# Patient Record
Sex: MALE | Race: WHITE | NOT HISPANIC OR LATINO | Employment: OTHER | URBAN - METROPOLITAN AREA
[De-identification: names, ages, dates, MRNs, and addresses within clinical notes are randomized per-mention and may not be internally consistent; named-entity substitution may affect disease eponyms.]

---

## 2019-04-15 ENCOUNTER — OFFICE VISIT - RIVER FALLS (OUTPATIENT)
Dept: FAMILY MEDICINE | Facility: CLINIC | Age: 69
End: 2019-04-15

## 2019-04-15 ASSESSMENT — MIFFLIN-ST. JEOR: SCORE: 1618.11

## 2019-04-18 LAB
1,25(OH)2D SERPL-MCNC: 45 PG/ML (ref 18–72)
BUN SERPL-MCNC: 11 MG/DL (ref 7–25)
BUN/CREAT RATIO - HISTORICAL: NORMAL (ref 6–22)
CALCIUM SERPL-MCNC: 9.5 MG/DL (ref 8.6–10.3)
CHLORIDE BLD-SCNC: 107 MMOL/L (ref 98–110)
CHOLEST SERPL-MCNC: 210 MG/DL
CHOLEST/HDLC SERPL: 4.3 {RATIO}
CO2 SERPL-SCNC: 27 MMOL/L (ref 20–32)
CREAT SERPL-MCNC: 1.05 MG/DL (ref 0.7–1.25)
EGFRCR SERPLBLD CKD-EPI 2021: 73 ML/MIN/1.73M2
ERYTHROCYTE [DISTWIDTH] IN BLOOD BY AUTOMATED COUNT: 12.3 % (ref 11–15)
GLUCOSE BLD-MCNC: 93 MG/DL (ref 65–99)
HCT VFR BLD AUTO: 45.7 % (ref 38.5–50)
HDLC SERPL-MCNC: 49 MG/DL
HGB BLD-MCNC: 15.6 GM/DL (ref 13.2–17.1)
LDLC SERPL CALC-MCNC: 142 MG/DL
MCH RBC QN AUTO: 32.2 PG (ref 27–33)
MCHC RBC AUTO-ENTMCNC: 34.1 GM/DL (ref 32–36)
MCV RBC AUTO: 94.4 FL (ref 80–100)
NONHDLC SERPL-MCNC: 161 MG/DL
PLATELET # BLD AUTO: 242 10*3/UL (ref 140–400)
PMV BLD: 11.5 FL (ref 7.5–12.5)
POTASSIUM BLD-SCNC: 5.1 MMOL/L (ref 3.5–5.3)
PSA SERPL-MCNC: 1.3 NG/ML
RBC # BLD AUTO: 4.84 10*6/UL (ref 4.2–5.8)
SODIUM SERPL-SCNC: 141 MMOL/L (ref 135–146)
TRIGL SERPL-MCNC: 85 MG/DL
VITAMIN D2, 1,25 (OH)2 - QUEST: <8 PG/ML
VITAMIN D3, 1,25 (OH)2 - QUEST: 45 PG/ML
WBC # BLD AUTO: 5.4 10*3/UL (ref 3.8–10.8)

## 2019-04-24 ENCOUNTER — OFFICE VISIT - RIVER FALLS (OUTPATIENT)
Dept: FAMILY MEDICINE | Facility: CLINIC | Age: 69
End: 2019-04-24

## 2019-04-24 ASSESSMENT — MIFFLIN-ST. JEOR: SCORE: 1602.68

## 2019-04-25 LAB — HOMOCYSTINE: 9.8 MCMOL/L

## 2019-04-26 ENCOUNTER — COMMUNICATION - RIVER FALLS (OUTPATIENT)
Dept: FAMILY MEDICINE | Facility: CLINIC | Age: 69
End: 2019-04-26

## 2020-12-08 ENCOUNTER — OFFICE VISIT - RIVER FALLS (OUTPATIENT)
Dept: FAMILY MEDICINE | Facility: CLINIC | Age: 70
End: 2020-12-08

## 2020-12-08 ENCOUNTER — TRANSFERRED RECORDS (OUTPATIENT)
Dept: MULTI SPECIALTY CLINIC | Facility: CLINIC | Age: 70
End: 2020-12-08

## 2020-12-08 ENCOUNTER — AMBULATORY - HEALTHEAST (OUTPATIENT)
Dept: SURGERY | Facility: TELEHEALTH | Age: 70
End: 2020-12-08

## 2020-12-08 DIAGNOSIS — K40.90 LEFT INGUINAL HERNIA: ICD-10-CM

## 2020-12-09 ENCOUNTER — COMMUNICATION - RIVER FALLS (OUTPATIENT)
Dept: FAMILY MEDICINE | Facility: CLINIC | Age: 70
End: 2020-12-09

## 2020-12-09 LAB
BUN SERPL-MCNC: 13 MG/DL (ref 7–25)
BUN/CREAT RATIO - HISTORICAL: NORMAL (ref 6–22)
CALCIUM SERPL-MCNC: 9.5 MG/DL (ref 8.6–10.3)
CHLORIDE BLD-SCNC: 105 MMOL/L (ref 98–110)
CHOLEST SERPL-MCNC: 183 MG/DL
CHOLEST/HDLC SERPL: 3.8 {RATIO}
CO2 SERPL-SCNC: 27 MMOL/L (ref 20–32)
CREAT SERPL-MCNC: 1.12 MG/DL (ref 0.7–1.25)
EGFRCR SERPLBLD CKD-EPI 2021: 67 ML/MIN/1.73M2
ERYTHROCYTE [DISTWIDTH] IN BLOOD BY AUTOMATED COUNT: 12.1 % (ref 11–15)
GLUCOSE BLD-MCNC: 86 MG/DL (ref 65–99)
HCT VFR BLD AUTO: 43.9 % (ref 38.5–50)
HDLC SERPL-MCNC: 48 MG/DL
HGB BLD-MCNC: 15.4 GM/DL (ref 13.2–17.1)
LDLC SERPL CALC-MCNC: 120 MG/DL
MCH RBC QN AUTO: 32.6 PG (ref 27–33)
MCHC RBC AUTO-ENTMCNC: 35.1 GM/DL (ref 32–36)
MCV RBC AUTO: 92.8 FL (ref 80–100)
NONHDLC SERPL-MCNC: 135 MG/DL
PLATELET # BLD AUTO: 214 10*3/UL (ref 140–400)
PMV BLD: 11.7 FL (ref 7.5–12.5)
POTASSIUM BLD-SCNC: 4.3 MMOL/L (ref 3.5–5.3)
PSA SERPL-MCNC: 1.4 NG/ML
RBC # BLD AUTO: 4.73 10*6/UL (ref 4.2–5.8)
SODIUM SERPL-SCNC: 139 MMOL/L (ref 135–146)
TRIGL SERPL-MCNC: 55 MG/DL
WBC # BLD AUTO: 4.3 10*3/UL (ref 3.8–10.8)

## 2020-12-15 ENCOUNTER — COMMUNICATION - RIVER FALLS (OUTPATIENT)
Dept: FAMILY MEDICINE | Facility: CLINIC | Age: 70
End: 2020-12-15

## 2020-12-31 ENCOUNTER — COMMUNICATION - RIVER FALLS (OUTPATIENT)
Dept: FAMILY MEDICINE | Facility: CLINIC | Age: 70
End: 2020-12-31

## 2021-01-05 ENCOUNTER — OFFICE VISIT - RIVER FALLS (OUTPATIENT)
Dept: FAMILY MEDICINE | Facility: CLINIC | Age: 71
End: 2021-01-05

## 2021-01-06 ENCOUNTER — COMMUNICATION - RIVER FALLS (OUTPATIENT)
Dept: FAMILY MEDICINE | Facility: CLINIC | Age: 71
End: 2021-01-06

## 2021-01-06 ENCOUNTER — COMMUNICATION - HEALTHEAST (OUTPATIENT)
Dept: SURGERY | Facility: CLINIC | Age: 71
End: 2021-01-06

## 2021-01-06 ENCOUNTER — SURGERY - HEALTHEAST (OUTPATIENT)
Dept: SURGERY | Facility: CLINIC | Age: 71
End: 2021-01-06

## 2021-01-06 ENCOUNTER — OFFICE VISIT - HEALTHEAST (OUTPATIENT)
Dept: SURGERY | Facility: TELEHEALTH | Age: 71
End: 2021-01-06

## 2021-01-06 DIAGNOSIS — K40.91 UNILATERAL RECURRENT INGUINAL HERNIA WITHOUT OBSTRUCTION OR GANGRENE: ICD-10-CM

## 2021-01-06 DIAGNOSIS — K40.90 UNILATERAL INGUINAL HERNIA WITHOUT OBSTRUCTION OR GANGRENE, RECURRENCE NOT SPECIFIED: ICD-10-CM

## 2021-01-07 ENCOUNTER — AMBULATORY - HEALTHEAST (OUTPATIENT)
Dept: SURGERY | Facility: AMBULATORY SURGERY CENTER | Age: 71
End: 2021-01-07

## 2021-01-07 DIAGNOSIS — Z11.59 ENCOUNTER FOR SCREENING FOR OTHER VIRAL DISEASES: ICD-10-CM

## 2021-01-25 ENCOUNTER — AMBULATORY - RIVER FALLS (OUTPATIENT)
Dept: FAMILY MEDICINE | Facility: CLINIC | Age: 71
End: 2021-01-25

## 2021-01-27 LAB — SARS-COV-2 RNA RESP QL NAA+PROBE: NEGATIVE

## 2021-01-27 ASSESSMENT — MIFFLIN-ST. JEOR: SCORE: 1562.78

## 2021-01-28 ENCOUNTER — ANESTHESIA - HEALTHEAST (OUTPATIENT)
Dept: SURGERY | Facility: AMBULATORY SURGERY CENTER | Age: 71
End: 2021-01-28

## 2021-01-29 ENCOUNTER — AMBULATORY - HEALTHEAST (OUTPATIENT)
Dept: SURGERY | Facility: CLINIC | Age: 71
End: 2021-01-29

## 2021-01-29 ENCOUNTER — SURGERY - HEALTHEAST (OUTPATIENT)
Dept: SURGERY | Facility: AMBULATORY SURGERY CENTER | Age: 71
End: 2021-01-29

## 2021-01-29 DIAGNOSIS — Z98.890 POSTOPERATIVE STATE: ICD-10-CM

## 2021-01-29 ASSESSMENT — MIFFLIN-ST. JEOR: SCORE: 1562.78

## 2021-02-12 ENCOUNTER — OFFICE VISIT - HEALTHEAST (OUTPATIENT)
Dept: SURGERY | Facility: CLINIC | Age: 71
End: 2021-02-12

## 2021-02-12 DIAGNOSIS — Z98.890 POSTOPERATIVE STATE: ICD-10-CM

## 2021-06-05 VITALS — HEIGHT: 69 IN | WEIGHT: 178 LBS | BODY MASS INDEX: 26.36 KG/M2

## 2021-06-05 VITALS — WEIGHT: 189.8 LBS | SYSTOLIC BLOOD PRESSURE: 124 MMHG | DIASTOLIC BLOOD PRESSURE: 64 MMHG | HEART RATE: 64 BPM

## 2021-06-14 NOTE — ANESTHESIA POSTPROCEDURE EVALUATION
Patient: Sarbjit Butcher  Procedure(s):  HERNIORRHAPHY, INGUINAL, LAPAROSCOPIC (Left)  Anesthesia type: general    Patient location: Phase II Recovery  Last vitals:   Vitals Value Taken Time   /71 01/29/21 1230   Temp 36.4  C (97.6  F) 01/29/21 1215   Pulse 58 01/29/21 1238   Resp 16 01/29/21 1215   SpO2 94 % 01/29/21 1238   Vitals shown include unvalidated device data.  Post vital signs: stable  Level of consciousness: awake and responds to simple questions  Post-anesthesia pain: pain controlled  Post-anesthesia nausea and vomiting: no  Pulmonary: unassisted, return to baseline  Cardiovascular: stable and blood pressure at baseline  Hydration: adequate  Anesthetic events: no    QCDR Measures:  ASA# 11 - Wendy-op Cardiac Arrest: ASA11B - Patient did NOT experience unanticipated cardiac arrest  ASA# 12 - Wendy-op Mortality Rate: ASA12B - Patient did NOT die  ASA# 13 - PACU Re-Intubation Rate: ASA13B - Patient did NOT require a new airway mgmt  ASA# 10 - Composite Anes Safety: ASA10A - No serious adverse event    Additional Notes:   18-Mar-2021 16:10

## 2021-06-14 NOTE — ANESTHESIA PREPROCEDURE EVALUATION
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      Dental - normal exam   (+) caps                       Anesthesia Plan  Planned anesthetic: general endotracheal  Versed/fentanyl/propofol/ravin  Ketamine PRN  Decadron/zofran    ASA 2   Induction: intravenous   Anesthetic plan and risks discussed with: patient  Anesthesia plan special considerations: antiemetics,   Post-op plan: routine recovery      1/25/21 covid pcr negative

## 2021-06-14 NOTE — ANESTHESIA CARE TRANSFER NOTE
Last vitals:   Vitals:    01/29/21 1133   BP: 127/84   Pulse: 77   Resp: 16   Temp: 36.4  C (97.5  F)   SpO2: 98%     Patient's level of consciousness is drowsy  Spontaneous respirations: yes  Maintains airway independently: yes  Dentition unchanged: yes  Oropharynx: oropharynx clear of all foreign objects    QCDR Measures:  ASA# 20 - Surgical Safety Checklist: WHO surgical safety checklist completed prior to induction    PQRS# 430 - Adult PONV Prevention: 4558F - Pt received => 2 anti-emetic agents (different classes) preop & intraop  ASA# 8 - Peds PONV Prevention: NA - Not pediatric patient, not GA or 2 or more risk factors NOT present  PQRS# 424 - Wendy-op Temp Management: 4559F - At least one body temp DOCUMENTED => 35.5C or 95.9F within required timeframe  PQRS# 426 - PACU Transfer Protocol: - Transfer of care checklist used  ASA# 14 - Acute Post-op Pain: ASA14B - Patient did NOT experience pain >= 7 out of 10

## 2021-06-14 NOTE — TELEPHONE ENCOUNTER
Spoke with Sarbjit and went over surgery details:    We've received instruction to get you scheduled for Laparoscopic left inguinal hernia repair with Dr Infante. We have that arranged as follows:     Surgery Date: Friday January 29th    Location: [facility] [address] (3rd Floor)                  [address] [place] [address]     Arrival Time: 10:00 AM (unless instructed otherwise by the pre-op nurse)    Prep Instructions:     1. Please schedule a pre-op physical with your primary care doctor. This may be virtual or face-to-face depending on your doctors preference. Call them right away to schedule this.    2. COVID19 testing is required within 4 days of surgery. You mentioned that you would like to do to this at Dr. Brown's office. Please have your clinic fax results to [phone]. If your test is positive, your surgery will be canceled.     3. Nothing to eat or drink for 8 hours before surgery unless instructed differently by the pre-op nurse.    4. No blood thinners including aspirin for one week prior to surgery. Verify this is safe for you with your primary care doctor before stopping.     5. You need an adult to drive you home and stay with you 24 hours after surgery.     6. No visitors are allowed at the facility or in the building. Family/Friends who accompany the patient will need to wait in their vehicle or return home to be called when patient is ready for .    7. When you arrive to the surgery center, you will be screened for COVID19 symptoms. If you screen positive, your surgery will need to be postponed for your safety.    8. If the community sees a new surge in COVID19 hospital admissions, your procedure may need to be postponed. We will contact you if this happens.    9. We always encourage you to notify your insurance any time you have something scheduled including surgery. The number is usually  right on the back of your insurance card. Please call [facility] Cost of Care at [phone] for the surgeon fees, and [facility] Cost of Care [phone] for facility fees if you need pricing information.     Call our office if you have any questions! Thank you!     Radha JARRELL  Surgery Scheduler  [facility]  Surgery Clinic [facility]  Direct line: [phone]   Main Line: [phone]  Direct Fax: [phone]

## 2021-06-14 NOTE — PROGRESS NOTES
HPI:  Sarbjit Butcher is a 70 y.o. male who was referred to me by Karson Brown MD for an inguinal hernia. He  presents today with complaints of a left inguinal bulge for several months.  He states that the discomfort is worse at the end of the day.  Denies any fevers, chills, nausea or emesis.     Allergies:Patient has no known allergies.    History reviewed. No pertinent past medical history.    History reviewed. No pertinent surgical history.    CURRENT MEDS:  No current outpatient medications on file.     No current facility-administered medications for this visit.        Family history-reviewed and noncontributory to the current admission     reports that he has never smoked. He has never used smokeless tobacco.    Review of Systems -   The 12 system review is within normal limits except for as mentioned above.  General ROS: No complaints or constitutional symptoms  Ophthalmic ROS: No complaints of visual changes  Skin: No complaints or symptoms   Endocrine: No complaints or symptoms  Hematologic/Lymphatic: No symptoms or complaints  Psychiatric: No symptoms or complaints  Respiratory ROS: no cough, shortness of breath, or wheezing  Cardiovascular ROS: no chest pain or dyspnea on exertion  Gastrointestinal ROS: As per HPI  Genito-Urinary ROS: no dysuria, trouble voiding, or hematuria  Musculoskeletal ROS: no joint or muscle pain  Neurological ROS: no TIA or stroke symptoms    /64   Pulse 64   Wt 189 lb 12.8 oz (86.1 kg)   There is no height or weight on file to calculate BMI.    EXAM:  GENERAL: Well developed male, No acute distress, pleasant and conversant   EYES: Pupils equal, round and reactive, no scleral icterus  CARDIAC: Regular rate and rhythm, no obvious murmurs noted  CHEST/LUNG: Clear to ascultation bilaterally, No ronchi, No wheezes  ABDOMEN: Left inguinal hernia, reducible, no evidence of right inguinal hernia  SKIN: Pink, warm and dry, no obvious rashes or lesions   NEURO:No  focal deficits, ambulatory  MUSCULOSKELETAL:No obvious deformities, no swelling, normal appearing          Assessment/Plan:   Sarbjit Butcher is a 70 y.o. male with a left inguinal hernia.  I have discussed the pathophysiology of inguinal hernias at length as well as the  surgical and non-operative management strategies.      The risks of Robotic, Laparoscopic and open inguinal hernia  surgery were explained in detail which include, but are not limited to, bleeding, infection of the mesh, recurrence of the hernia, chronic pain, poor cosmesis, the need for reoperative intervention, the possibility of conversion from a laparoscopic approach to an open approach, subcutaneous emphysema, injury to vital structures,  blood clots, heart attack, stroke and death.  Additionally, the risks of observation were also discussed in detail which include, but are not limited to, chronic pain, enlargement of the hernia, incarceration, strangulation and death.      He understands everything that was discussed and has consented to proceed with surgery.   We will plan on scheduling a laparoscopic left inguinal hernia repair at his desired date.       Addison Infante D.O. [facility]  [phone]  [facility] Department of Surgery

## 2021-06-15 NOTE — PROGRESS NOTES
"Sarbjit Butcher is a 70 y.o. male who is being evaluated via a billable telephone visit.      The patient has been notified of following:     \"This telephone visit will be conducted via a call between you and your physician/provider. We have found that certain health care needs can be provided without the need for a physical exam.  This service lets us provide the care you need with a short phone conversation.  If a prescription is necessary we can send it directly to your pharmacy.  If lab work is needed we can place an order for that and you can then stop by our lab to have the test done at a later time.    Telephone visits are billed at different rates depending on your insurance coverage. During this emergency period, for some insurers they may be billed the same as an in-person visit.  Please reach out to your insurance provider with any questions.    If during the course of the call the physician/provider feels a telephone visit is not appropriate, you will not be charged for this service.\"    Patient has given verbal consent to a Telephone visit? Yes    What phone number would you like to be contacted at? [phone]    Patient would like to receive their AVS by AVS Preference: Mail a copy.    Additional provider notes: Patient is status post laparoscopic inguinal hernia repair.  He is doing well with no discomfort.  He denies any bulges or recurrent symptoms.  He is following the protocol for no heavy lifting or anything strenuous for a total of 3 weeks.  At this point he is happy with his care and will follow up as needed.    It is my professional judgment, in conjunction with the current COVID-19 pandemic recommended restrictions on elective procedures, that this procedure can safely be deferred until a later date which may be beyond the typical treatment period/window. I have engaged in a discussion with the patient (and family) about the need for this deferral, explained why the procedure can be safely " deferred, the potential risks associated with the deferral, and answered all of the patients questions. The patient has also been advised that if there is a change in their condition/symptoms they should notify me, my clinic/facility, and/or seek appropriate medical care. That patient has also been informed that the decision to defer this procedure can be re-evaluated if there is a change in their condition/symptoms. I have also told the patient they have the right to seek a second opinion on the decision to defer their procedure.  Addison Infante DO [facility]  [phone]'    Phone call duration: 5 minutes    Daiana Laboy CMA

## 2021-06-16 PROBLEM — K40.90 UNILATERAL INGUINAL HERNIA WITHOUT OBSTRUCTION OR GANGRENE, RECURRENCE NOT SPECIFIED: Status: ACTIVE | Noted: 2021-01-07

## 2022-02-11 VITALS
HEART RATE: 68 BPM | HEART RATE: 68 BPM | SYSTOLIC BLOOD PRESSURE: 120 MMHG | TEMPERATURE: 97.7 F | WEIGHT: 187.8 LBS | HEIGHT: 70 IN | BODY MASS INDEX: 26.88 KG/M2 | DIASTOLIC BLOOD PRESSURE: 70 MMHG | DIASTOLIC BLOOD PRESSURE: 74 MMHG | BODY MASS INDEX: 26.4 KG/M2 | TEMPERATURE: 97.9 F | SYSTOLIC BLOOD PRESSURE: 118 MMHG | HEIGHT: 70 IN | WEIGHT: 184.4 LBS

## 2022-02-11 VITALS
BODY MASS INDEX: 25.97 KG/M2 | SYSTOLIC BLOOD PRESSURE: 108 MMHG | HEART RATE: 70 BPM | DIASTOLIC BLOOD PRESSURE: 72 MMHG | WEIGHT: 181 LBS | OXYGEN SATURATION: 98 %

## 2022-02-16 NOTE — RESULTS
-------------------------------- Original Report -------------------------------    Exam: MR Shoulder Without Contrast (Right) 12/15/2020 11:07 AM CST      INDICATION:  Adhesive capsulitis of right shoulde   per pt: feels locked up and tight, cant raise all the way up   symptoms 1 year      COMPARISON:  No prior studies are available.      TECHNIQUE: A multiplanar multiple pulse sequence MRI examination is    performed. A standard noncontrast MRI of the shoulder was performed in   the axial, coronal, and sagittal planes using T1, T2, and proton    density sequences.      FINDINGS:        Rotator cuff: Moderate to high-grade tearing of the deep fibers of the   subscapularis tendon estimated to involve 50-70% of the deep fibers.    Superficial fibers are relatively intact. No muscular atrophy.    Abnormal dislocation of the long head of the biceps tendon out of the    superior aspect of the bicipital groove.      Mild to moderate predominantly intrasubstance tearing of the distal    supraspinatus tendon along with moderate amount of tendinosis. Overall   the tearing is estimated to involve 20-40% of the fibers.      Mild distal articular surface tearing of the infraspinatus tendon.      Intact teres minor tendon.      Labrum: Moderate to high-grade complex tearing throughout the majority   of the posterior labrum associated with full thickness labral chondral   fissuring. Underlying subcortical bone marrow edema and sclerosis.      Moderate to marked signal abnormality and tearing involving the    majority of the anterior labrum. Moderate tearing of the majority of    the superior labrum including in the region of the biceps tendon    anchor estimated at 50-70% thickness.      Mild articular surface fraying of the inferior labrum.      Bones and cartilage: Mild-to-moderate degenerative change at the    acromioclavicular joint with a small inferior marginal osteophyte.      Type I to II acromion.      Mild to moderate  degenerative change of the glenohumeral joint with an   inferior humeral marginal osteophyte in addition to chondromalacia    particularly involving the posterior aspect of the glenoid where there   is full-thickness cartilage loss and labral chondral fissuring    associated with underlying subcortical sclerosis and edema.      No fracture or dislocation.      Scant fibroglandular and deltoid bursal effusion.      Small glenohumeral joint effusion with an anterior glenoid consistent    joint recess containing what is likely a 1.1 cm loose body.      Glenohumeral ligaments are relatively unremarkable.      Mild to moderate signal abnormality in tearing involving the proximal    long head of the biceps tendon to just inferior to the bicipital    groove. As discussed above, there is medial dislocation of the    proximal long head of the biceps tendon at the level of the proximal    bicipital groove due to tearing of the deep fibers of the    subscapularis tendon.      Small axillary lymph nodes.      Neurovascular structures are unremarkable.      IMPRESSION:       1.  Moderate to high-grade tearing of the deep fibers of the    subscapularis tendon associated with medial dislocation of the    proximal long head of the biceps tendon out of the proximal aspect of    the bicipital groove.      2.  Mild to moderate degenerative change at the glenohumeral joint    with full thickness cartilage loss and fissuring across the majority    of the posterior glenoid with underlying subcortical sclerosis and    edema.      3.  Extensive labral tearing as above including moderate high-grade    partial-thickness tearing in the region of the biceps tendon anchor.      4.  Mild-to-moderate tearing of the proximal long head of the biceps    tendon.      5.  Small shoulder joint effusion with a prominent anterior glenoid    joint recess containing a 1.1 cm loose body.      6.  Mild-to-moderate tearing and tendinosis involving the distal     supraspinatus tendon.      7.  Mild-to-moderate degenerative change at the acromioclavicular    joint.       Corrected by: Hazel Swenson      Electronically signed by: Dr Barry Quesada MD 12/15/2020 12:31 PM CST      Study Location:  [facility]      Workstation:  [id number]          Read by: Barry Quesada M.D.  Reviewed and Electronically Signed by: Barry Quesada M.D.

## 2022-02-16 NOTE — LETTER
(Inserted Image. Unable to display)     [address] [place] [address] [phone] (phone)  [phone] (fax)  KALI ESTRELLA     [address] [place] [phone]        Dear KALI,    Thank you for selecting our practice as your care provider. Below you will find the results and recent diagnostic testings outcomes we have reviewed.        Please make an appointment to discuss these results.      Result Name Current Result Reference Range   Sodium Level (mmol/L)  141 4/15/2019 135 - 146   Potassium Level (mmol/L)  5.1 4/15/2019 3.5 - 5.3   Chloride Level (mmol/L)  107 4/15/2019 98 - 110   CO2 Level (mmol/L)  27 4/15/2019 20 - 32   Glucose Level (mg/dL)  93 4/15/2019 65 - 99   BUN (mg/dL)  11 4/15/2019 7 - 25   Creatinine Level (mg/dL)  1.05 4/15/2019 0.70 - 1.25   eGFR (mL/min/1.73m2)  73 4/15/2019 > OR = 60 -    Calcium Level (mg/dL)  9.5 4/15/2019 8.6 - 10.3   Vitamin D, 1, 25 Dihydroxy Total (pg/mL)  45 4/15/2019 18 - 72   Vitamin D2, 1, 25 Dihydroxy (pg/mL)  <8 4/15/2019    Vitamin D3, 1, 25 Dihydroxy (pg/mL)  45 4/15/2019    Cholesterol (mg/dL) ((H)) 210 4/15/2019  - <200   Non-HDL Cholesterol ((H)) 161 4/15/2019  - <130   HDL (mg/dL)  49 4/15/2019 >40 -    Cholesterol/HDL Ratio  4.3 4/15/2019  - <5.0   LDL ((H)) 142 4/15/2019    Triglyceride (mg/dL)  85 4/15/2019  - <150   PSA (ng/mL)  1.3 4/15/2019  - < OR = 4.0   WBC  5.4 4/15/2019 3.8 - 10.8   RBC  4.84 4/15/2019 4.20 - 5.80   Hgb (gm/dL)  15.6 4/15/2019 13.2 - 17.1   Hct (%)  45.7 4/15/2019 38.5 - 50.0   MCV (fL)  94.4 4/15/2019 80.0 - 100.0   MCH (pg)  32.2 4/15/2019 27.0 - 33.0   MCHC (gm/dL)  34.1 4/15/2019 32.0 - 36.0   RDW (%)  12.3 4/15/2019 11.0 - 15.0   Platelet  242 4/15/2019 140 - 400   MPV (fL)  11.5 4/15/2019 7.5 - 12.5       Please contact my practice at the number listed above if you have any questions or concerns.     Sincerely,        Karson Brown MD, FAAFP

## 2022-02-16 NOTE — LETTER
(Inserted Image. Unable to display)     [address] [place] [address]  [phone] (phone)    May 13, 2019    KALI ESTRELLA   [facility] [place], [place] [id number]    Dear KALI,      Thank you for selecting our practice as your care provider. Below you will find the results and recent diagnostic testings outcomes we have reviewed.     The cologuard people would like you to submit another stool sample.  Please call with any questions.          Please contact my practice at the number listed above if you have any questions or concerns.     Sincerely,    Stephanie NICE, Karson

## 2022-02-16 NOTE — TELEPHONE ENCOUNTER
---------------------  From: Karson Brown MD   To: Referral Coordinators Pool ([id number]);     Sent: 1/3/2021 1:48:07 PM CST !  Subject: General Message     Urgent!    I referred this patient on December 8th for General Surgery consult for inguinal hernia.  Patient reports he has not heard from us and that his hernia is getting worse.  Can we expedite this ASAP please.    CHT---------------------  From: Keli Estes (Referral Coordinators Pool ([id number]))   To: Karson Brown MD;     Sent: 1/4/2021 7:50:39 AM CST  Subject: RE: General Message     Yes, we certainly can.

## 2022-02-16 NOTE — PROGRESS NOTES
Patient:   KALI ESTRELLA            MRN: [id number]            FIN: [id number]               Age:   68 years     Sex:  Male     :  1950   Associated Diagnoses:   Medicare annual wellness visit, initial   Author:   Stephanie NICE Felton      Visit Information      Care Providers  Not recorded for selected visit.  Ancillary Services  Not recorded for selected visit.      Current Visit Date:  04/15/2019      Chief Complaint   4/15/2019 9:02 AM CDT    AWV        History of Present Illness             The patient presents for Medicare annual wellness exam.  The patient's general health status is described as unchanged and stable.  The patient's diet is described as balanced.  Exercise occasional.  Associated symptoms consist of denies shortness of breath and denies weight loss.        Review of Systems   Ear/Nose/Mouth/Throat:  Hearing is evaluated.    See completed Health History for Review of Systems   Psychiatric:  GDS noted.       Health Status   Allergies:    Allergic Reactions (Selected)  No Known Medication Allergies   Medications:  (Selected)      Problem list:    No problem items selected or recorded.   Medicare Assessments      Reddy Fall Risk  (04/15/2019 09:02 am)       History of Fall in Last 3 Months Reddy:  No   Functional Assessment  (04/15/2019 09:02 am)       Bathing ADL Index:  Independent (2)       Dressing ADL Index:  Independent (2)       Toileting ADL Index:  Independent (2)       Transferring Bed or Chair ADL Index:  Independent (2)       Feeding ADL Index:  Independent (2)   Depression Screening  Not recorded for selected visit.    Detailed Depression Screening  Not recorded for selected visit.    Geriatric Depression Screen  (04/15/2019 09:02 am)       Geriatric Depression Satisfied Life:  Yes       Geriatric Depression Dropped Activities:  No       Geriatric Depression Life Empty:  No       Geriatric Depression Bored:  No       Geriatric Depression Good Spirits:  Yes       Geriatric  Depression Afraid Bad Things:  No       Geriatric Depression Feel Happy:  Yes       Geriatric Depression Feel Helpless:  No       Geriatric Depression Prefer to Stay Home:  No       Geriatric Depression Memory Problems:  No       Geriatric Depression Wonderful Be Alive:  Yes       Geriatric Depression Feel Worthless:  No       Geriatric Depression Situation Hopeless:  No       Geriatric Depression Others Better Off:  No       Geriatric Depression Full of Energy:  Yes       Geriatric Depression Total Score:  0   Hearing Screen  (04/15/2019 09:02 am)       Ear, Right Audiogram:  Pass       Ear, Left Audiogram:  Pass       Hearing Screen Comments:  Missed 4000hz both ears   Home Safety Screen  (04/15/2019 09:02 am)       Emergency Numbers Kept/Updated:  Yes       Aware of Smoking Dangers:  Yes       Smoke Alarms/Fire Extinguisher Available:  Yes       Household Members Fire Safety Knowledge:  Yes       Firearms Unloaded and Secure:  Yes       Floor Rugs Removed or Fastened:  No       Mats in Bathtub/Shower:  Yes       Stairway Rails or Banisters:  Yes       Outdoor Clutter Safety:  Yes       Indoor Clutter Safety:  Yes       Electrical Cord Safety:  Yes   Vision Screen  (04/15/2019 09:02 am)       Eye, Right Visual Acuity Evaluated:  20/15       Eye, Left Visual Acuity Evaluated:  20/15       Vision Test Type:  Snellen   ADL's and Safety reviewed with patient.      Histories   Past Medical History:    No active or resolved past medical history items have been selected or recorded.   Family History:    No family history items have been selected or recorded.   Procedure history:    No active procedure history items have been selected or recorded.   Social History:             No active social history items have been recorded.      Physical Examination   Vital Signs   4/15/2019 9:02 AM CDT Temperature Tympanic 97.7 DegF  LOW    Peripheral Pulse Rate 68 bpm    Pulse Site Radial artery    HR Method Manual    Systolic Blood  Pressure 118 mmHg    Diastolic Blood Pressure 70 mmHg    Mean Arterial Pressure 86 mmHg    BP Site Left arm    BP Method Manual      Measurements from flowsheet : Measurements   4/15/2019 9:02 AM CDT Height Measured - Standard 70 in    Weight Measured - Standard 187.8 lb    BSA 2.05 m2    Body Mass Index 26.94 kg/m2  HI      General:  Alert and oriented, No acute distress.    Eye:  Pupils are equal, round and reactive to light, Normal conjunctiva.    HENT:  Tympanic membranes are clear, Oral mucosa is moist.    Neck:  Supple, Non-tender, No carotid bruit, No lymphadenopathy.    Respiratory:  Respirations are non-labored.    Cardiovascular:  Normal rate, Regular rhythm, No edema.    Gastrointestinal:  Soft, Non-tender, Non-distended.    Musculoskeletal:  Normal range of motion, Normal gait.    Integumentary:  Warm, No rash.    Neurologic:  Alert, Oriented, No focal deficits.    Cognition and Speech:  Oriented, Speech clear and coherent, Functional cognition intact.    Psychiatric:  Cooperative, Appropriate mood & affect, Normal judgment, Patient's GDS and CAGE questionnaire reviewed and discussed with patient. .       Impression and Plan   Course:  Progressing as expected.    Plan:  Discussed  preventative services.  Appropriate weight and diet discussed.  Discussed Advance Life Directives.    Preventative services needed::  Preventative services checklist reviewed, updated and copy given to patient.  Please see scanned document.         HIV risk screening: No.    Patient Instructions:          Limitations: Limit caffeine intake, Limit alcohol consumption.         Counseled: Patient, Regarding treatment, Regarding medications, Diet, Activity, Verbalized understanding.    Diagnosis     Medicare annual wellness visit, initial ([id number] Z00.00).     Orders     Orders   Lab (Gen Lab  Reference Lab):  PSA, Total (Room)* ([facility]) (Order): Specimen Type: Serum, Collection Date: 4/15/2019 9:25 AM CDT  Vitamin D, 1,25  Dihydroxy LC/MS/MS* ([facility]) (Order): Specimen Type: Serum, Collection Date: 4/15/2019 9:25 AM CDT  Lipid panel with reflex to direct ldl* ([facility]) (Order): Specimen Type: Serum, Collection Date: 4/15/2019 9:25 AM CDT  CBC (h/h, RBC, indices, WBC, Plt)* ([facility]) (Order): Specimen Type: Blood, Collection Date: 4/15/2019 9:25 AM CDT  Basic Metabolic Panel* ([facility]) (Order): Specimen Type: Serum, Collection Date: 4/15/2019 9:25 AM CDT.     Total time spent with patient and coordination of care:  _  minutes

## 2022-02-16 NOTE — TELEPHONE ENCOUNTER
---------------------  From: Keli Estes   To: Karson Brown MD;     Sent: 1/4/2021 10:45:38 AM CST  Subject: [facility] Referral     Per Dr. Infante's office, they left messages for the patient on 12-9, 12-16 & 12-23.  I called patient and provided him with the ph # to call and schedule.---------------------  From: Karson Brown MD   To: Keli Estes;     Sent: 1/4/2021 3:56:01 PM CST  Subject: RE: [facility] Referral     Thanks.

## 2022-02-16 NOTE — TELEPHONE ENCOUNTER
---------------------  From: Eda Reid   To: Stephanie NICE, [place];     Sent: 1/5/2021 1:32:08 PM CST  Subject: Hearing Evaluation Results     Dr. Brown,     I had the pleasure of seeing your patient for a hearing evaluation and his detailed results are listed below.    Hx: Patient reports failed hearing screening. He has difficulty with conversation in a variety of listening environments. 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 Denied tinnitus, pain, pressure, drainage, dizziness and family history of hearing loss.    Results: Otoscopy: clear canals bilaterally. Normal hearing sloping to a moderate sensorineural hearing loss (SNHL) bilaterally.  Word Recognition Score: 92% left, 88% right    Rec: Bilateral hearing aid candidate. Devices were discussed in detail. Annual hearing test or sooner if concerns arise.    Thank you for your referral and please let me know if any questions arise,    Michael Roberts, CCC-A

## 2022-02-16 NOTE — NURSING NOTE
Medicare Visit Entered On:  12/8/2020 9:08 AM CST    Performed On:  12/8/2020 9:01 AM CST by Aminta Bradshaw CMA               Summary   Chief Complaint :   AWV. Preventive handout reviewed.   Weight Measured :   181 lb(Converted to: 181 lb 0 oz, 82.100 kg)    Height/Length Estimated :   70 in(Converted to: 5 ft 10 in, 177.80 cm)    Systolic Blood Pressure :   108 mmHg   Diastolic Blood Pressure :   72 mmHg   Mean Arterial Pressure :   84 mmHg   Peripheral Pulse Rate :   70 bpm   BP Site :   Right arm   BP Method :   Manual   Oxygen Saturation :   98 %   Aminta Bradshaw CMA - 12/8/2020 9:01 AM CST   Health Status   Allergies Verified? :   Yes   Medication History Verified? :   Yes   Medical History Verified? :   Yes   Pre-Visit Planning Status :   Completed   Tobacco Use? :   Never smoker   Aminta Bradshaw CMA - 12/8/2020 9:01 AM CST   Consents   Consent for Immunization Exchange :   Consent Granted   Consent for Immunizations to Providers :   Consent Granted   Aminta Bradshaw CMA - 12/8/2020 9:01 AM CST   Meds / Allergies   (As Of: 12/8/2020 9:08:31 AM CST)   Allergies (Active)   No Known Medication Allergies  Estimated Onset Date:   Unspecified ; Created By:   Miladys Lazcano MA; Reaction Status:   Active ; Category:   Drug ; Substance:   No Known Medication Allergies ; Type:   Allergy ; Updated By:   Miladys Lazcano MA; Reviewed Date:   12/8/2020 9:05 AM CST        Medication List   (As Of: 12/8/2020 9:08:31 AM CST)   No Known Home Medications     Aminta Bradshaw CMA - 12/8/2020 9:05:00 AM           Social History   Social History   (As Of: 12/8/2020 9:08:31 AM CST)   Alcohol:  Current      1-2 times per month   (Last Updated: 4/25/2019 10:56:58 AM CDT by Ana Pillai)          Tobacco:  Denies Tobacco Use      Never (less than 100 in lifetime)   (Last Updated: 4/25/2019 10:57:04 AM CDT by Ana Pillai)   Never (less than 100 in lifetime)   (Last Updated: 12/8/2020 9:02:20 AM CST by Aminta Bradshaw CMA)           Electronic Cigarette/Vaping:        Electronic Cigarette Use: Never.   (Last Updated: 12/8/2020 9:02:25 AM CST by Aminta Bradshaw CMA)          Substance Abuse:  Denies Substance Abuse      Never   (Last Updated: 4/25/2019 10:57:09 AM CDT by Ana Pillai)          Employment/School:        Retired, Work/School description: Farmer.   (Last Updated: 4/25/2019 10:56:27 AM CDT by Ana Pillai)          Home/Environment:        Spouse/Partner name: Gayle Nissen.   (Last Updated: 4/25/2019 10:56:19 AM CDT by Ana Pillai)          Nutrition/Health:        Type of diet: Regular.   (Last Updated: 4/25/2019 10:58:06 AM CDT by Ana Pillai)          Exercise:        Exercise frequency: Daily.   Comments:  4/25/2019 10:57 AM - Ana Pillai: Farm activity.   (Last Updated: 4/25/2019 10:57:40 AM CDT by Ana Pillai)          Sexual:        Sexually active: Yes.  Sexual orientation: Straight or heterosexual.   (Last Updated: 4/25/2019 10:57:53 AM CDT by Ana Pillai)            Geriatric Depression Screening   Geriatric Depression Satisfied Life :   Yes   Geriatric Depression Dropped Activities :   No   Geriatric Depression Life Empty :   No   Geriatric Depression Bored :   No   Geriatric Depression Good Spirits :   Yes   Geriatric Depression Afraid Bad Things :   No   Geriatric Depression Feel Happy :   Yes   Geriatric Depression Feel Helpless :   No   Geriatric Depression Prefer to Stay Home :   No   Geriatric Depression Memory Problems :   No   Geriatric Depression Wonderful Be Alive :   Yes   Geriatric Depression Feel Worthless :   No   Geriatric Depression Situation Hopeless :   No   Geriatric Depression Others Better Off :   No   Geriatric Depression Full of Energy :   Yes   Geriatric Depression Total Score :   0    Aminta Bradshaw CMA - 12/8/2020 9:01 AM CST   Home Safety Screen   Emergency Numbers Kept/Updated :   Yes   Aware of Smoking Dangers :   Yes   Smoke Alarms/Fire Extinguisher Available :   Yes   Household  Members Fire Safety Knowledge :   Yes   Firearms Unloaded and Secure :   Yes   Floor Rugs Removed or Fastened :   Yes   Mats in Bathtub/Shower :   Yes   Stairway Rails or Banisters :   Yes   Outdoor Clutter Safety :   Yes   Indoor Clutter Safety :   Yes   Electrical Cord Safety :   Yes   Aminta Bradshaw CMA - 12/8/2020 9:01 AM CST   Advance Directives   Advance Directive :   No   Aminta Bradshaw CMA - 12/8/2020 9:01 AM CST   Reddy Fall Risk   History of Fall in Last 3 Months Reddy :   No   Aminta Bradshaw CMA - 12/8/2020 9:01 AM CST   Functional Assessment   Focused Functional Assessment Grid   Bathing :   Independent (2)   Dressing :   Independent (2)   Toileting :   Independent (2)   Transferring Bed or Chair :   Independent (2)   Continence :   Independent (2)   Feeding :   Independent (2)   Aminta Bradshaw CMA - 12/8/2020 9:01 AM CST   ADL Index Score :   12    Capable of Shopping :   Yes   Capable of Walking :   Yes   Capable of Housekeeping :   Yes   Capable of Managing Medications :   Yes   Capable of Handling Finances :   Yes   Aminta Bradshaw CMA - 12/8/2020 9:01 AM CST

## 2022-02-16 NOTE — NURSING NOTE
Hearing Screening Entered On:  12/8/2020 9:08 AM CST    Performed On:  12/8/2020 9:08 AM CST by Aminta Bradshaw CMA               Additional Hearing Screen   Hearing Screen Comments :   Bilateral fail   Aminta Bradshaw CMA - 12/8/2020 9:08 AM CST

## 2022-02-16 NOTE — PROGRESS NOTES
Patient:   KALI ESTRELLA            MRN: [id number]            FIN: [id number]               Age:   68 years     Sex:  Male     :  1950   Associated Diagnoses:   Pure hypercholesterolemia   Author:   Karson Brown MD      Visit Information      Date of Service: 2019 08:45 am  Performing Location: [facility]  Encounter#: [id number]      Primary Care Provider (PCP):  Karson Brown MD    NPI# [id number]      Referring Provider:  Karson Brown MD    NPI# [id number]      Chief Complaint   2019 8:56 AM CDT    Follow up Elevated labs     Chief complaint and symptoms noted above confirmed with patient.      Interval History   Cholesterol Management   Total cholesterol results See below.  Associated symptoms characterized by no fatigue, chest pain, joint pain or muscle weakness.  Previous evaluations/tests:  Reviewed from [facility].        Review of Systems   Constitutional:  Negative.    Respiratory:  Negative.    Cardiovascular:  Negative except as documented in history of present illness.    Gastrointestinal:  Negative.       Health Status   Allergies:    Allergic Reactions (Selected)  No Known Medication Allergies      Histories   Past Medical History:    No active or resolved past medical history items have been selected or recorded.   Family History:    No family history items have been selected or recorded.   Procedure history:    No active procedure history items have been selected or recorded.   Social History:             No active social history items have been recorded.      Physical Examination   Vital Signs   2019 8:56 AM CDT Temperature Tympanic 97.9 DegF    Peripheral Pulse Rate 68 bpm    Pulse Site Radial artery    HR Method Manual    Systolic Blood Pressure 120 mmHg    Diastolic Blood Pressure 74 mmHg    Mean Arterial Pressure 89 mmHg    BP Site Right arm    BP Method Manual      General:  Alert and oriented, No acute distress.    Respiratory:  Lungs are  clear to auscultation.    Cardiovascular:  Normal rate.    13.4% 10 year cardiac risk factor, advised of statin impact      Review / Management   Results review:  Lab results   4/15/2019 9:53 AM CDT Sodium Level 141 mmol/L    Potassium Level 5.1 mmol/L    Chloride Level 107 mmol/L    CO2 Level 27 mmol/L    Glucose Level 93 mg/dL    BUN 11 mg/dL    Creatinine 1.05 mg/dL    BUN/Creat Ratio NOT APPLICABLE    eGFR 73 mL/min/1.73m2    eGFR African American 85 mL/min/1.73m2    Calcium Level 9.5 mg/dL    Vitamin D, 1, 25 Dihydroxy Total 45 pg/mL    Vitamin D2, 1, 25 Dihydroxy <8 pg/mL    Vitamin D3, 1, 25 Dihydroxy 45 pg/mL    Cholesterol 210 mg/dL  HI    Non-  HI    HDL 49 mg/dL    Chol/HDL Ratio 4.3      HI    Triglyceride 85 mg/dL    PSA 1.3 ng/mL    WBC 5.4    RBC 4.84    Hgb 15.6 gm/dL    Hct 45.7 %    MCV 94.4 fL    MCH 32.2 pg    MCHC 34.1 gm/dL    RDW 12.3 %    Platelet 242    MPV 11.5 fL   .       Impression and Plan   Diagnosis     Pure hypercholesterolemia ([id number] E78.00).     Course:  Not improving.    Plan:  Enroll in exercise program.         Diet: Low cholesterol.    Patient Instructions:       Counseled: Patient, Verbalized understanding.    Orders     Orders (Selected)   Outpatient Orders  Ordered (Dispatched)  Homocysteine, cardiovascular* ([facility]): Specimen Type: Serum, Collection Date: 04/24/19 9:18:00 CDT.     Orders     Orders   Requests (Return to Office):  Return to Clinic (Request) (Order): RFV: LIPID, Return in 3 months.

## 2022-02-16 NOTE — NURSING NOTE
Medicare Visit Entered On:  4/15/2019 9:14 AM CDT    Performed On:  4/15/2019 9:02 AM CDT by Miladys Lazcano MA               Summary   Chief Complaint :   AWV   Weight Measured :   187.8 lb(Converted to: 187 lb 13 oz, 85.18 kg)    Height Measured :   70 in(Converted to: 5 ft 10 in, 177.80 cm)    Body Mass Index :   26.94 kg/m2 (HI)    Body Surface Area :   2.05 m2   Systolic Blood Pressure :   118 mmHg   Diastolic Blood Pressure :   70 mmHg   Mean Arterial Pressure :   86 mmHg   Peripheral Pulse Rate :   68 bpm   BP Site :   Left arm   Pulse Site :   Radial artery   BP Method :   Manual   HR Method :   Manual   Temperature Tympanic :   97.7 DegF(Converted to: 36.5 DegC)  (LOW)    Miladys Lazcano MA - 4/15/2019 9:02 AM CDT   Health Status   Allergies Verified? :   Yes   Medication History Verified? :   Yes   Medical History Verified? :   Yes   Pre-Visit Planning Status :   Completed   Tobacco Use? :   Never smoker   Miladys Lazcano MA - 4/15/2019 9:02 AM CDT   Consents   Consent for Immunization Exchange :   Consent Granted   Consent for Immunizations to Providers :   Consent Granted   Miladys Lazcano MA - 4/15/2019 9:02 AM CDT   Meds / Allergies   (As Of: 4/15/2019 9:14:57 AM CDT)   Allergies (Active)   No Known Medication Allergies  Estimated Onset Date:   Unspecified ; Created By:   Miladys Lazcano MA; Reaction Status:   Active ; Category:   Drug ; Substance:   No Known Medication Allergies ; Type:   Allergy ; Updated By:   Miladys Lazcano MA; Reviewed Date:   4/15/2019 9:03 AM CDT        Medication List   (As Of: 4/15/2019 9:14:57 AM CDT)   No Known Home Medications     Miladys Lazcano MA - 4/15/2019 9:02:57 AM           Geriatric Depression Screening   Geriatric Depression Satisfied Life :   Yes   Geriatric Depression Dropped Activities :   No   Geriatric Depression Life Empty :   No   Geriatric Depression Bored :   No   Geriatric Depression Good Spirits :   Yes   Geriatric Depression Afraid Bad Things :   No    Geriatric Depression Feel Happy :   Yes   Geriatric Depression Feel Helpless :   No   Geriatric Depression Prefer to Stay Home :   No   Geriatric Depression Memory Problems :   No   Geriatric Depression Wonderful Be Alive :   Yes   Geriatric Depression Feel Worthless :   No   Geriatric Depression Situation Hopeless :   No   Geriatric Depression Others Better Off :   No   Geriatric Depression Full of Energy :   Yes   Geriatric Depression Total Score :   0    Miladys Lazcano MA - 4/15/2019 9:02 AM CDT   Hearing and Vision Screening   Audiogram Result Right Ear :   Pass   Audiogram Result Left Ear :   Pass   Hearing Screen Comments :   Missed 4000hz both ears    Visual Acuity Evaluated Left Eye :   20/15   Visual Acuity Evaluated Right Eye :   20/15   Vision Test Type :   Snellen   Miladys Lazcano MA - 4/15/2019 9:02 AM CDT   Home Safety Screen   Emergency Numbers Kept/Updated :   Yes   Aware of Smoking Dangers :   Yes   Smoke Alarms/Fire Extinguisher Available :   Yes   Household Members Fire Safety Knowledge :   Yes   Firearms Unloaded and Secure :   Yes   Floor Rugs Removed or Fastened :   No   Mats in Bathtub/Shower :   Yes   Stairway Rails or Banisters :   Yes   Outdoor Clutter Safety :   Yes   Indoor Clutter Safety :   Yes   Electrical Cord Safety :   Yes   Miladys Lazcano MA - 4/15/2019 9:02 AM CDT   Reddy Fall Risk   History of Fall in Last 3 Months Reddy :   No   Miladys Lazcano MA - 4/15/2019 9:02 AM CDT   Functional Assessment   Focused Functional Assessment Grid   Bathing :   Independent (2)   Dressing :   Independent (2)   Toileting :   Independent (2)   Transferring Bed or Chair :   Independent (2)   Feeding :   Independent (2)   Miladys Lazcano MA - 4/15/2019 9:02 AM CDT   Capable of Shopping :   Yes   Capable of Walking :   Yes   Capable of Housekeeping :   Yes   Capable of Managing Medications :   Yes   Capable of Handling Finances :   Yes   Miladys Lazcano MA - 4/15/2019 9:02 AM CDT

## 2022-02-16 NOTE — LETTER
(Inserted Image. Unable to display)   July 24, 2019      KALI ESTRELLA   [address] [place], [place] [id number]        Dear KALI,      Thank you for selecting [facility] (previously [facility]) for your healthcare needs.     Our records indicate you are due for the following services:     Fasting Lab Tests ~ Please do not eat or drink anything 10 hours prior to your scheduled appointment time.                (Water and any medications that you may need are allowed unless directed otherwise.)    If you had your labs done at another facility or with [facility] at [facility], please bring in a copy of the results to your next visit, mail a copy, or drop off a copy of your results to your Healthcare Provider.    To schedule an appointment or if you have further questions, please contact your primary clinic:   [facility]          [phone]   [facility]    [phone]             [facility]         [phone]      Powered by [employer]    Sincerely,    Karson Brown M.D.

## 2022-02-16 NOTE — NURSING NOTE
Comprehensive Intake Entered On:  4/24/2019 8:59 AM CDT    Performed On:  4/24/2019 8:56 AM CDT by Miladys Lazcano MA               Summary   Chief Complaint :   Follow up Elevated labs    Weight Measured :   184.4 lb(Converted to: 184 lb 6 oz, 83.64 kg)    Height Measured :   70 in(Converted to: 5 ft 10 in, 177.80 cm)    Body Mass Index :   26.46 kg/m2 (HI)    Body Surface Area :   2.03 m2   Systolic Blood Pressure :   120 mmHg   Diastolic Blood Pressure :   74 mmHg   Mean Arterial Pressure :   89 mmHg   Peripheral Pulse Rate :   68 bpm   BP Site :   Right arm   Pulse Site :   Radial artery   BP Method :   Manual   HR Method :   Manual   Temperature Tympanic :   97.9 DegF(Converted to: 36.6 DegC)    Miladys Lazcano MA - 4/24/2019 8:56 AM CDT   Health Status   Allergies Verified? :   Yes   Medication History Verified? :   Yes   Medical History Verified? :   Yes   Pre-Visit Planning Status :   Completed   Tobacco Use? :   Never smoker   Miladys Lazcano MA - 4/24/2019 8:56 AM CDT   Consents   Consent for Immunization Exchange :   Consent Denied   Consent for Immunizations to Providers :   Consent Granted   Miladys Lazcano MA - 4/24/2019 8:56 AM CDT   Meds / Allergies   (As Of: 4/24/2019 8:59:54 AM CDT)   Allergies (Active)   No Known Medication Allergies  Estimated Onset Date:   Unspecified ; Created By:   Miladys Lazcano MA; Reaction Status:   Active ; Category:   Drug ; Substance:   No Known Medication Allergies ; Type:   Allergy ; Updated By:   Miladys Lazcano MA; Reviewed Date:   4/24/2019 8:58 AM CDT        Medication List   (As Of: 4/24/2019 8:59:54 AM CDT)   No Known Home Medications     Miladys Lazcano MA - 4/24/2019 8:58:38 AM

## 2022-02-16 NOTE — PROGRESS NOTES
Called pt about his negative COVID results. Pt is still asymptomatic. Results were sent to Dr. George Infante @ [phone] for his upcoming surgery. Pt had no questions or concerns at this time.

## 2022-02-16 NOTE — TELEPHONE ENCOUNTER
---------------------  From: Aminta Medeiros (Appointment Pool ([id number]))   To: Rody Evans;     Sent: 12/15/2020 3:49:31 PM CST  Subject: RE: HHS Eval        SCHEDULED    ---------------------  From: Rody Evans   To: Appointment Pool ([id number]);     Sent: 12/15/2020 3:42:00 PM CST  Subject: HHS Eval      Please contact patient to schedule      (Inserted Image. Unable to display)

## 2022-02-16 NOTE — LETTER
(Inserted Image. Unable to display)     [address], [place] [id number] [phone] (phone)  [phone] (fax)  KALI ESTRELLA     [address] [place] [id number]        Dear KALI,    Thank you for selecting our practice as your care provider. Below you will find the results and recent diagnostic testings outcomes we have reviewed.       These are acceptable, please keep scheduled follow up appointments.      Result Name Current Result Reference Range   Homocysteine (mcmol/L)  9.8 4/24/2019  - <11.4       Please contact my practice at the number listed above if you have any questions or concerns.     Sincerely,        Karson Brown MD, FAAFP

## 2022-02-16 NOTE — LETTER
(Inserted Image. Unable to display)   December 09, 2021  KALI ESTRELLA   [address] [place] [id number]        Dear KALI,    Thank you for selecting [facility] for your healthcare needs.    Our records indicate you are due for the following services:     Annual Wellness Visit  Fasting Lab Tests ~ Please do not eat or drink anything 10 hours prior to your scheduled appointment time.  (Water and any medications that you may need are allowed unless directed otherwise.)     If you had your labs done at another facility or with Direct Access Lab Testing at [facility], please bring in a copy of the results to your next visit, mail a copy, or drop off a copy of your results to your Healthcare Provider.     (FYI   Regarding office visits: In some instances, a video visit or telephone visit may be offered as an option.)    To schedule an appointment or if you have further questions, please contact your clinic at [phone].    Powered by [employer]    Sincerely,    Karson Brown MD

## 2022-02-16 NOTE — PROGRESS NOTES
Patient:   KALI ESTRELLA            MRN: [id number]            FIN: [id number]               Age:   70 years     Sex:  Male     :  1950   Associated Diagnoses:   Medicare annual wellness visit, initial; Encounter for screening for lipoid disorders; Encounter for screening examination for impaired glucose regulation and diabetes mellitus; Screening for prostate cancer; Left inguinal hernia; Adhesive capsulitis of right shoulder; Left hip pain   Author:   Karson Brown MD      Visit Information      Date of Service: 2020 08:55 am  Performing Location: [facility]  Encounter#: [id number]      Primary Care Provider (PCP):  Karson Brown MD    NPI# [id number]      Referring Provider:  Karson Brown MD# [id number]      Care Providers  Not recorded for selected visit.  Ancillary Services  Not recorded for selected visit.          Current Visit Date:  2020  Last AWV Date:  04/15/2019          Chief Complaint   2020 9:01 AM CST    AWV. Preventive handout reviewed.      History of Present Illness             The patient presents for Medicare annual wellness exam.  The patient's general health status is described as unchanged and stable.  The patient's diet is described as balanced.  Exercise occasional.  Associated symptoms consist of denies shortness of breath and denies weight loss.        Review of Systems   Ear/Nose/Mouth/Throat:  Hearing is evaluated.    See completed Health History for Review of Systems   Psychiatric:  GDS noted.       Health Status   Allergies:    Allergic Reactions (Selected)  No Known Medication Allergies   Medications:  (Selected)      Problem list:    No problem items selected or recorded.   Medicare Assessments      Reddy Fall Risk  (2020 09:01 am)       History of Fall in Last 3 Months Reddy:  No     Functional Assessment  (2020 09:01 am)       Bathing ADL Index:  Independent (2)       Dressing ADL Index:   Independent (2)       Toileting ADL Index:  Independent (2)       Transferring Bed or Chair ADL Index:  Independent (2)       Continence ADL Index:  Independent (2)       Feeding ADL Index:  Independent (2)       ADL Index Score:  12     Depression Screening  Not recorded for selected visit.    Detailed Depression Screening  Not recorded for selected visit.    Geriatric Depression Screen  (12/08/2020 09:01 am)       Geriatric Depression Satisfied Life:  Yes       Geriatric Depression Dropped Activities:  No       Geriatric Depression Life Empty:  No       Geriatric Depression Bored:  No       Geriatric Depression Good Spirits:  Yes       Geriatric Depression Afraid Bad Things:  No       Geriatric Depression Feel Happy:  Yes       Geriatric Depression Feel Helpless:  No       Geriatric Depression Prefer to Stay Home:  No       Geriatric Depression Memory Problems:  No       Geriatric Depression Wonderful Be Alive:  Yes       Geriatric Depression Feel Worthless:  No       Geriatric Depression Situation Hopeless:  No       Geriatric Depression Others Better Off:  No       Geriatric Depression Full of Energy:  Yes       Geriatric Depression Total Score:  0     Hearing Screen  (12/08/2020 09:08 am)       Hearing Screen Comments:  Bilateral fail     Home Safety Screen  (12/08/2020 09:01 am)       Emergency Numbers Kept/Updated:  Yes       Aware of Smoking Dangers:  Yes       Smoke Alarms/Fire Extinguisher Available:  Yes       Household Members Fire Safety Knowledge:  Yes       Firearms Unloaded and Secure:  Yes       Floor Rugs Removed or Fastened:  Yes       Mats in Bathtub/Shower:  Yes       Stairway Rails or Banisters:  Yes       Outdoor Clutter Safety:  Yes       Indoor Clutter Safety:  Yes       Electrical Cord Safety:  Yes     Vision Screen  Not recorded for selected visit.     ADL's and Safety reviewed with patient.      Histories   Past Medical History:    No active or resolved past medical history items have  been selected or recorded.   Family History:    Diabetes mellitus  Mother ()  Aortic aneurysm  Father ()  CVA - Cerebrovascular accident  Mother ()  CAD - Coronary artery disease  Mother ()  Thyroid Cancer  Father ()     Procedure history:    Screening for colon cancer (SNOMED CT 9855767139) performed by Karson Brown MD on 2019 at 68 Years.  Comments:  6/3/2019 4:51 PM CDT - Taryn Lunsford MA result:  negative  Recommendation:  f/u 3yrs  Cancer of skin (SNOMED CT 3464632392) in 2017 at 67 Years.  Colon cancer screening (SNOMED CT 3949680677) on 2015 at 65 Years.  Comments:  2019 2:23 PM VISHNUT - Ana Pillai negative.  Appendectomy (SNOMED CT 674512300) on 2004 at 54 Years.  Colonoscopy (SNOMED CT 508556039) in  at 52 Years.  Hernia (SNOMED CT 826018421) in  at 39 Years.  Comments:  2019 2:46 PM VISHNUT Ana Armas  Left inguinal.  Dislocation of shoulder (SNOMED CT 0705673924).  Comments:  2019 11:26 AM Ana Toure  x 5   Social History:        Electronic Cigarette/Vaping Assessment            Electronic Cigarette Use: Never.      Alcohol Assessment: Current            1-2 times per month      Tobacco Assessment: Denies Tobacco Use            Never (less than 100 in lifetime)            Never (less than 100 in lifetime)      Substance Abuse Assessment: Denies Substance Abuse            Never      Employment and Education Assessment            Retired, Work/School description: Farmer.      Home and Environment Assessment            Spouse/Partner name: Gayle Nissen.      Nutrition and Health Assessment            Type of diet: Regular.      Exercise and Physical Activity Assessment            Exercise frequency: Daily.                     Comments:                      2019 - Ana Pillai activity.      Sexual Assessment            Sexually active: Yes.  Sexual  orientation: Straight or heterosexual.        Physical Examination   Vital Signs   12/8/2020 9:01 AM CST Peripheral Pulse Rate 70 bpm    Systolic Blood Pressure 108 mmHg    Diastolic Blood Pressure 72 mmHg    Mean Arterial Pressure 84 mmHg    BP Site Right arm    BP Method Manual    Oxygen Saturation 98 %      Measurements from flowsheet : Measurements   12/8/2020 9:01 AM CST Height/Length Estimated 70 in    Weight Measured - Standard 181 lb      General:  Alert and oriented, No acute distress.    Eye:  Pupils are equal, round and reactive to light, Normal conjunctiva.    HENT:  Tympanic membranes are clear, Oral mucosa is moist.    Neck:  Supple, Non-tender, No carotid bruit, No lymphadenopathy.    Respiratory:  Respirations are non-labored.    Cardiovascular:  Normal rate, Regular rhythm, No edema.    Gastrointestinal:  Soft, Non-tender, Non-distended.    Genitourinary:       Groin/ inguinal region: Left, Inguinal hernia.    Musculoskeletal:          Upper extremity exam: Shoulder ( Right, Range of motion ( Diminished ) ).         Lower extremity exam: Hip ( Left, Tenderness ).       Integumentary:  Warm, No rash.    Neurologic:  Alert, Oriented, No focal deficits.    Cognition and Speech:  Oriented, Speech clear and coherent, Functional cognition intact.    Psychiatric:  Cooperative, Appropriate mood & affect, Normal judgment, Patient's GDS and CAGE questionnaire reviewed and discussed with patient. .       Impression and Plan   Diagnosis     Left inguinal hernia (UTR46-UN K40.90).     Course:  Progressing as expected.    Plan:  Discussed  preventative services.  Appropriate weight and diet discussed.  Discussed Advance Life Directives.    Preventative services needed::  Preventative services checklist reviewed, updated and copy given to patient.  Please see scanned document.         HIV risk screening: No.    Patient Instructions:          Limitations: Limit caffeine intake, Limit alcohol consumption.          Counseled: Patient, Regarding treatment, Regarding medications, Diet, Activity, Verbalized understanding.    Orders     Orders   Requests (Consults / Referrals):  Referral (Request) (Order): 12/8/2020 9:26 AM CST, Referred to: General Surgery, Referred to: Dr. Infante, Reason for referral: Left Inguinal hernia, Left inguinal hernia.       Diagnosis     Medicare annual wellness visit, initial (MXR11-OT Z00.00).     Encounter for screening for lipoid disorders (QOS47-DQ Z13.220).     Encounter for screening examination for impaired glucose regulation and diabetes mellitus (ZKG90-PD Z13.1).     Screening for prostate cancer (PFZ14-GZ Z12.5).     Orders     Orders   Lab (Gen Lab  Reference Lab):  Lipid panel with reflex to direct ldl* ([facility]) (Order): Specimen Type: Serum, Collection Date: 12/8/2020 9:17 AM CST  PSA, Total (Room)* ([facility]) (Order): Specimen Type: Serum, Collection Date: 12/8/2020 9:17 AM CST  CBC (h/h, RBC, indices, WBC, Plt)* ([facility]) (Order): Specimen Type: Blood, Collection Date: 12/8/2020 9:17 AM CST  Basic Metabolic Panel* ([facility]) (Order): Specimen Type: Serum, Collection Date: 12/8/2020 9:17 AM CST.     Diagnosis     Adhesive capsulitis of right shoulder (SMG92-HE M75.01).     Left hip pain (MQX41-VA M25.552).     Course:  Progressing as expected.    Orders     Orders   Requests (Radiology):  MRI Shoulder w/o Contrast Right (Request) (Order): Adhesive capsulitis of right shoulder.       Orders   Requests (Radiology):  XR Hip Min 2 Views Left (Request) (Order): Left hip pain.       Total time spent with patient and coordination of care:  _  minutes

## 2022-02-16 NOTE — TELEPHONE ENCOUNTER
---------------------  From: Lilian Freeman LPN (Phone Messages Pool ([id number]_[facility]))   To: Phone Messages Pool ([id number]_[place] - [place]);     Sent: 1/6/2021 4:21:36 PM CST  Subject: covid test before surgery     Phone Message    PCP:   [facility]T      Time of Call:  4:07pm       Person Calling:  pt  Phone number:  [phone]    Returned call at: 4:15pm    Note:   Pt LM stating he had an appt with Dr. Infante today. Pt says he is scheduled 1/29 to have his hernia fixed. Pt says he needs to have a covid test ahead of time and is asking if [facility] can order this.    Returned call to see if pt was needing a preop. Pt says Dr. Infante did a preop today. Pt also says he had a physical with [facility] in December (12/8/20). Told pt that surgeon should be ordering covid test. Advised pt contact Dr. Ulloa office and have them send us an order if pt is wanting covid test done here.    Last office visit and reason:  12/8/20 Medicare AW

## 2022-02-16 NOTE — LETTER
(Inserted Image. Unable to display)     [address] [place] [address]  [phone] (phone)    June 04, 2019    KALI ESTRELLA   [facility] [place] [place] [id number]    Dear KALI,      Thank you for selecting our practice as your care provider. Below you will find the results and recent diagnostic testings outcomes we have reviewed.      The results of your Cologuard test show no evidence of precancerous lesions in your stool.  Please repeat the test in 3 years.           Please contact my practice at the number listed above if you have any questions or concerns.     Sincerely,    Stephanie NICE, Karson

## 2022-02-16 NOTE — PROGRESS NOTES
Seen for pre-op COVID testing at [facility] per Dr. George Infante. Fax to: [phone]    O2 Sat = 95%  (Children under 12 do not require O2 sat)    Specimen sent to:  [facility]    PUI form faxed to: [facility].

## 2022-02-16 NOTE — LETTER
(Inserted Image. Unable to display)     [address] [place] [address]  [phone] (phone)  [phone] (fax)  KALI ESTRELLA     [address] [place], [place] [id number]        Dear KALI,    Thank you for selecting our practice as your care provider. Below you will find the results and recent diagnostic testings outcomes we have reviewed.        These are acceptable, please keep scheduled follow up appointments.      Result Name Current Result Previous Result Reference Range   Sodium Level (mmol/L)  139 12/8/2020  141 4/15/2019 135 - 146   Potassium Level (mmol/L)  4.3 12/8/2020  5.1 4/15/2019 3.5 - 5.3   Chloride Level (mmol/L)  105 12/8/2020  107 4/15/2019 98 - 110   CO2 Level (mmol/L)  27 12/8/2020  27 4/15/2019 20 - 32   Glucose Level (mg/dL)  86 12/8/2020  93 4/15/2019 65 - 99   BUN (mg/dL)  13 12/8/2020  11 4/15/2019 7 - 25   Creatinine Level (mg/dL)  1.12 12/8/2020  1.05 4/15/2019 0.70 - 1.25   eGFR (mL/min/1.73m2)  67 12/8/2020  73 4/15/2019 > OR = 60 -    eGFR  (mL/min/1.73m2)  77 12/8/2020  85 4/15/2019 > OR = 60 -    Calcium Level (mg/dL)  9.5 12/8/2020  9.5 4/15/2019 8.6 - 10.3   Cholesterol (mg/dL)  183 12/8/2020 ((H)) 210 4/15/2019  - <200   Non-HDL Cholesterol ((H)) 135 12/8/2020 ((H)) 161 4/15/2019  - <130   HDL (mg/dL)  48 12/8/2020  49 4/15/2019 > OR = 40 -    Cholesterol/HDL Ratio  3.8 12/8/2020  4.3 4/15/2019  - <5.0   LDL ((H)) 120 12/8/2020 ((H)) 142 4/15/2019    Triglyceride (mg/dL)  55 12/8/2020  85 4/15/2019  - <150   PSA (ng/mL)  1.4 12/8/2020  1.3 4/15/2019  - < OR = 4.0   WBC  4.3 12/8/2020  5.4 4/15/2019 3.8 - 10.8   RBC  4.73 12/8/2020  4.84 4/15/2019 4.20 - 5.80   Hgb (gm/dL)  15.4 12/8/2020  15.6 4/15/2019 13.2 - 17.1   Hct (%)  43.9 12/8/2020  45.7 4/15/2019 38.5 - 50.0   MCV (fL)  92.8 12/8/2020  94.4 4/15/2019 80.0 - 100.0   MCH (pg)  32.6 12/8/2020  32.2 4/15/2019 27.0 - 33.0   MCHC (gm/dL)  35.1 12/8/2020  34.1 4/15/2019 32.0 - 36.0   RDW (%)   12.1 12/8/2020  12.3 4/15/2019 11.0 - 15.0   Platelet  214 12/8/2020  242 4/15/2019 140 - 400   MPV (fL)  11.7 12/8/2020  11.5 4/15/2019 7.5 - 12.5       Please contact my practice at the number listed above if you have any questions or concerns.     Sincerely,        Karson Brown MD, FAAFP

## 2022-06-10 RX ORDER — DOCUSATE SODIUM 100 MG/1
100 CAPSULE, LIQUID FILLED ORAL 2 TIMES DAILY
COMMUNITY
Start: 2021-01-29 | End: 2022-06-14

## 2022-06-14 ENCOUNTER — OFFICE VISIT (OUTPATIENT)
Dept: FAMILY MEDICINE | Facility: CLINIC | Age: 72
End: 2022-06-14
Payer: COMMERCIAL

## 2022-06-14 VITALS
HEIGHT: 69 IN | OXYGEN SATURATION: 95 % | RESPIRATION RATE: 16 BRPM | BODY MASS INDEX: 27.11 KG/M2 | HEART RATE: 73 BPM | WEIGHT: 183 LBS | DIASTOLIC BLOOD PRESSURE: 88 MMHG | SYSTOLIC BLOOD PRESSURE: 124 MMHG

## 2022-06-14 DIAGNOSIS — Z00.00 ENCOUNTER FOR MEDICARE ANNUAL WELLNESS EXAM: ICD-10-CM

## 2022-06-14 DIAGNOSIS — N52.9 ERECTILE DYSFUNCTION, UNSPECIFIED ERECTILE DYSFUNCTION TYPE: ICD-10-CM

## 2022-06-14 DIAGNOSIS — Z13.29 THYROID DISORDER SCREENING: ICD-10-CM

## 2022-06-14 DIAGNOSIS — Z12.5 SCREENING FOR PROSTATE CANCER: ICD-10-CM

## 2022-06-14 DIAGNOSIS — Z13.6 ENCOUNTER FOR LIPID SCREENING FOR CARDIOVASCULAR DISEASE: Primary | ICD-10-CM

## 2022-06-14 DIAGNOSIS — Z12.11 SPECIAL SCREENING FOR MALIGNANT NEOPLASMS, COLON: ICD-10-CM

## 2022-06-14 DIAGNOSIS — Z13.1 SCREENING FOR DIABETES MELLITUS: ICD-10-CM

## 2022-06-14 DIAGNOSIS — Z13.220 ENCOUNTER FOR LIPID SCREENING FOR CARDIOVASCULAR DISEASE: Primary | ICD-10-CM

## 2022-06-14 DIAGNOSIS — R05.9 COUGH: ICD-10-CM

## 2022-06-14 LAB
ANION GAP SERPL CALCULATED.3IONS-SCNC: 6 MMOL/L (ref 3–14)
BUN SERPL-MCNC: 17 MG/DL (ref 7–30)
CALCIUM SERPL-MCNC: 8.9 MG/DL (ref 8.5–10.1)
CHLORIDE BLD-SCNC: 109 MMOL/L (ref 94–109)
CHOLEST SERPL-MCNC: 168 MG/DL
CO2 SERPL-SCNC: 26 MMOL/L (ref 20–32)
CREAT SERPL-MCNC: 1.07 MG/DL (ref 0.66–1.25)
FASTING STATUS PATIENT QL REPORTED: YES
GFR SERPL CREATININE-BSD FRML MDRD: 75 ML/MIN/1.73M2
GLUCOSE BLD-MCNC: 98 MG/DL (ref 70–99)
HDLC SERPL-MCNC: 44 MG/DL
LDLC SERPL CALC-MCNC: 114 MG/DL
NONHDLC SERPL-MCNC: 124 MG/DL
POTASSIUM BLD-SCNC: 4.1 MMOL/L (ref 3.4–5.3)
PSA SERPL-MCNC: 2.8 UG/L (ref 0–4)
SODIUM SERPL-SCNC: 141 MMOL/L (ref 133–144)
TRIGL SERPL-MCNC: 52 MG/DL
TSH SERPL DL<=0.005 MIU/L-ACNC: 1.06 MU/L (ref 0.4–4)

## 2022-06-14 PROCEDURE — 99397 PER PM REEVAL EST PAT 65+ YR: CPT | Performed by: FAMILY MEDICINE

## 2022-06-14 PROCEDURE — 84443 ASSAY THYROID STIM HORMONE: CPT | Performed by: FAMILY MEDICINE

## 2022-06-14 PROCEDURE — 99213 OFFICE O/P EST LOW 20 MIN: CPT | Mod: 25 | Performed by: FAMILY MEDICINE

## 2022-06-14 PROCEDURE — G0103 PSA SCREENING: HCPCS | Performed by: FAMILY MEDICINE

## 2022-06-14 PROCEDURE — 80048 BASIC METABOLIC PNL TOTAL CA: CPT | Performed by: FAMILY MEDICINE

## 2022-06-14 PROCEDURE — 80061 LIPID PANEL: CPT | Performed by: FAMILY MEDICINE

## 2022-06-14 PROCEDURE — 36415 COLL VENOUS BLD VENIPUNCTURE: CPT | Performed by: FAMILY MEDICINE

## 2022-06-14 ASSESSMENT — ACTIVITIES OF DAILY LIVING (ADL): CURRENT_FUNCTION: NO ASSISTANCE NEEDED

## 2022-06-14 ASSESSMENT — ENCOUNTER SYMPTOMS
ABDOMINAL PAIN: 0
HEARTBURN: 1
JOINT SWELLING: 0
PALPITATIONS: 0
FREQUENCY: 0
HEMATOCHEZIA: 0
FEVER: 0
PARESTHESIAS: 0
DYSURIA: 0
COUGH: 1
HEMATURIA: 0
MYALGIAS: 0
SORE THROAT: 0
EYE PAIN: 0
ARTHRALGIAS: 0
CONSTIPATION: 0
WEAKNESS: 0
DIARRHEA: 0
DIZZINESS: 0
NERVOUS/ANXIOUS: 0
CHILLS: 0
SHORTNESS OF BREATH: 0
NAUSEA: 0

## 2022-06-14 NOTE — LETTER
June 20, 2022      Sarbjit Butcher   [address] [facility] [address]        Dear ,    We are writing to inform you of your test results.    Your test results fall within the expected range(s) or remain unchanged from previous results.  Please continue with current treatment plan.    Resulted Orders   Basic metabolic panel   Result Value Ref Range    Sodium 141 133 - 144 mmol/L    Potassium 4.1 3.4 - 5.3 mmol/L    Chloride 109 94 - 109 mmol/L    Carbon Dioxide (CO2) 26 20 - 32 mmol/L    Anion Gap 6 3 - 14 mmol/L    Urea Nitrogen 17 7 - 30 mg/dL    Creatinine 1.07 0.66 - 1.25 mg/dL    Calcium 8.9 8.5 - 10.1 mg/dL    Glucose 98 70 - 99 mg/dL    GFR Estimate 75 >60 mL/min/1.73m2      Comment:      Effective December 21, 2021 eGFRcr in adults is calculated using the 2021 CKD-EPI creatinine equation which includes age and gender (Reuben garvey al., [place], DOI: 10.1056/FJJRdk4750732)   Prostate Specific Antigen Screen   Result Value Ref Range    Prostate Specific Antigen Screen 2.80 0.00 - 4.00 ug/L    Narrative    Assay Method:  Chemiluminescence using Siemens   Vista analyzer.   TSH   Result Value Ref Range    TSH 1.06 0.40 - 4.00 mU/L   Lipid panel reflex to direct LDL Fasting   Result Value Ref Range    Cholesterol 168 <200 mg/dL    Triglycerides 52 <150 mg/dL    Direct Measure HDL 44 >=40 mg/dL    LDL Cholesterol Calculated 114 (H) <=100 mg/dL    Non HDL Cholesterol 124 <130 mg/dL    Patient Fasting > 8hrs? Yes     Narrative    Cholesterol  Desirable:  <200 mg/dL    Triglycerides  Normal:  Less than 150 mg/dL  Borderline High:  150-199 mg/dL  High:  200-499 mg/dL  Very High:  Greater than or equal to 500 mg/dL    Direct Measure HDL  Female:  Greater than or equal to 50 mg/dL   Male:  Greater than or equal to 40 mg/dL    LDL Cholesterol  Desirable:  <100mg/dL  Above Desirable:  100-129 mg/dL   Borderline High:  130-159 mg/dL   High:  160-189 mg/dL   Very High:  >= 190 mg/dL    Non HDL  Cholesterol  Desirable:  130 mg/dL  Above Desirable:  130-159 mg/dL  Borderline High:  160-189 mg/dL  High:  190-219 mg/dL  Very High:  Greater than or equal to 220 mg/dL       If you have any questions or concerns, please call the clinic at the number listed above.       Sincerely,      Karson Brown MD

## 2022-06-14 NOTE — PATIENT INSTRUCTIONS
Patient Education   Personalized Prevention Plan  You are due for the preventive services outlined below.  Your care team is available to assist you in scheduling these services.  If you have already completed any of these items, please share that information with your care team to update in your medical record.  Health Maintenance Due   Topic Date Due     ANNUAL REVIEW OF HM ORDERS  Never done     COVID-19 Vaccine (1) Never done     Colorectal Cancer Screening  Never done     Hepatitis C Screening  Never done     Diptheria Tetanus Pertussis (DTAP/TDAP/TD) Vaccine (1 - Tdap) Never done     Cholesterol Lab  Never done     Zoster (Shingles) Vaccine (1 of 2) Never done     Pneumococcal Vaccine (1 - PCV) Never done     AORTIC ANEURYSM SCREENING (SYSTEM ASSIGNED)  Never done       Exercise for a Healthier Heart  You may wonder how you can improve the health of your heart. If you re thinking about exercise, you re on the right track. You don t need to become an athlete. But you do need a certain amount of brisk exercise to help strengthen your heart. If you have been diagnosed with a heart condition, your healthcare provider may advise exercise to help stabilize your condition. To help make exercise a habit, choose safe, fun activities.      Exercise with a friend. When activity is fun, you're more likely to stick with it.   Before you start  Check with your healthcare provider before starting an exercise program. This is especially important if you have not been active for a while. It's also important if you have a long-term (chronic) health problem such as heart disease, diabetes, or obesity. Or if you are at high risk for having these problems.   Why exercise?  Exercising regularly offers many healthy rewards. It can help you do all of the following:     Improve your blood cholesterol level to help prevent further heart trouble    Lower your blood pressure to help prevent a stroke or heart attack    Control diabetes,  or reduce your risk of getting this disease    Improve your heart and lung function    Reach and stay at a healthy weight    Make your muscles stronger so you can stay active    Prevent falls and fractures by slowing the loss of bone mass (osteoporosis)    Manage stress better    Reduce your blood pressure    Improve your sense of self and your body image  Exercise tips      Ease into your routine. Set small goals. Then build on them. If you are not sure what your activity level should be, talk with your healthcare provider first before starting an exercise routine.    Exercise on most days. Aim for a total of 150 minutes (2 hours and 30 minutes) or more of moderate-intensity aerobic activity each week. Or 75 minutes (1 hour and 15 minutes) or more of vigorous-intensity aerobic activity each week. Or try for a combination of both. Moderate activity means that you breathe heavier and your heart rate increases but you can still talk. Think about doing 40 minutes of moderate exercise, 3 to 4 times a week. For best results, activity should last for about 40 minutes to lower blood pressure and cholesterol. It's OK to work up to the 40-minute period over time. Examples of moderate-intensity activity are walking 1 mile in 15 minutes. Or doing 30 to 45 minutes of yard work.    Step up your daily activity level.  Along with your exercise program, try being more active the whole day. Walk instead of drive. Or park further away so that you take more steps each day. Do more household tasks or yard work. You may not be able to meet the advised mount of physical activity. But doing some moderate- or vigorous-intensity aerobic activity can help reduce your risk for heart disease. Your healthcare provider can help you figure out what is best for you.    Choose 1 or more activities you enjoy.  Walking is one of the easiest things you can do. You can also try swimming, riding a bike, dancing, or taking an exercise class.    When to  call your healthcare provider  Call your healthcare provider if you have any of these:     Chest pain or feel dizzy or lightheaded    Burning, tightness, pressure, or heaviness in your chest, neck, shoulders, back, or arms    Abnormal shortness of breath    More joint or muscle pain    A very fast or irregular heartbeat (palpitations)  [employer] last reviewed this educational content on 7/1/2019 2000-2021 [employer]. All rights reserved. This information is not intended as a substitute for professional medical care. Always follow your healthcare professional's instructions.          Understanding USDA MyPlate  The USDA has guidelines to help you make healthy food choices. These are called MyPlate. MyPlate shows the food groups that make up healthy meals using the image of a place setting. Before you eat, think about the healthiest choices for what to put on your plate or in your cup or bowl. To learn more about building a healthy plate, visit www.choosemyplate.gov.    The food groups    Fruits. Any fruit or 100% fruit juice counts as part of the Fruit Group. Fruits may be fresh, canned, frozen, or dried, and may be whole, cut-up, or pureed. Make 1/2 of your plate fruits and vegetables.    Vegetables. Any vegetable or 100% vegetable juice counts as a member of the Vegetable Group. Vegetables may be fresh, frozen, canned, or dried. They can be served raw or cooked and may be whole, cut-up, or mashed. Make 1/2 of your plate fruits and vegetables.    Grains. All foods made from grains are part of the Grains Group. These include wheat, rice, oats, cornmeal, and barley. Grains are often used to make foods such as bread, pasta, oatmeal, cereal, tortillas, and grits. Grains should be no more than 1/4 of your plate. At least half of your grains should be whole grains.    Protein. This group includes meat, poultry, seafood, beans and peas, eggs, processed soy products (such as tofu), nuts (including nut  butters), and seeds. Make protein choices no more than 1/4 of your plate. Meat and poultry choices should be lean or low fat.    Dairy. The Dairy Group includes all fluid milk products and foods made from milk that contain calcium, such as yogurt and cheese. (Foods that have little calcium, such as cream, butter, and cream cheese, are not part of this group.) Most dairy choices should be low-fat or fat-free.    Oils. Oils aren't a food group, but they do contain essential nutrients. However it's important to watch your intake of oils. These are fats that are liquid at room temperature. They include canola, corn, olive, soybean, vegetable, and sunflower oil. Foods that are mainly oil include mayonnaise, certain salad dressings, and soft margarines. You likely already get your daily oil allowance from the foods you eat.  Things to limit  Eating healthy also means limiting these things in your diet:       Salt (sodium). Many processed foods have a lot of sodium. To keep sodium intake down, eat fresh vegetables, meats, poultry, and seafood when possible. Purchase low-sodium, reduced-sodium, or no-salt-added food products at the store. And don't add salt to your meals at home. Instead, season them with herbs and spices such as dill, oregano, cumin, and paprika. Or try adding flavor with lemon or lime zest and juice.    Saturated fat. Saturated fats are most often found in animal products such as beef, pork, and chicken. They are often solid at room temperature, such as butter. To reduce your saturated fat intake, choose leaner cuts of meat and poultry. And try healthier cooking methods such as grilling, broiling, roasting, or baking. For a simple lower-fat swap, use plain nonfat yogurt instead of mayonnaise when making potato salad or macaroni salad.    Added sugars. These are sugars added to foods. They are in foods such as ice cream, candy, soda, fruit drinks, sports drinks, energy drinks, cookies, pastries, jams, and  syrups. Cut down on added sugars by sharing sweet treats with a family member or friend. You can also choose fruit for dessert, and drink water or other unsweetened beverages.     [employer] last reviewed this educational content on 6/1/2020 2000-2021 [employer]. All rights reserved. This information is not intended as a substitute for professional medical care. Always follow your healthcare professional's instructions.

## 2022-06-14 NOTE — PROGRESS NOTES
"SUBJECTIVE:   Sarbjit Butcher is a 71 year old male who presents for Preventive Visit.    Patient has been advised of split billing requirements and indicates understanding: Yes  Are you in the first 12 months of your Medicare coverage?  No    Healthy Habits:     In general, how would you rate your overall health?  Excellent    Frequency of exercise:  None    Do you usually eat at least 4 servings of fruit and vegetables a day, include whole grains    & fiber and avoid regularly eating high fat or \"junk\" foods?  No    Taking medications regularly:  Not Applicable    Medication side effects:  No muscle aches    Ability to successfully perform activities of daily living:  No assistance needed    Home Safety:  No safety concerns identified    Hearing Impairment:  No hearing concerns    In the past 6 months, have you been bothered by leaking of urine?  No    In general, how would you rate your overall mental or emotional health?  Good      PHQ-2 Total Score: 0    Additional concerns today:  No    Do you feel safe in your environment? Yes    Have you ever done Advance Care Planning? (For example, a Health Directive, POLST, or a discussion with a medical provider or your loved ones about your wishes): Yes, patient states has an Advance Care Planning document and will bring a copy to the clinic.       Fall risk  Fallen 2 or more times in the past year?: No  Any fall with injury in the past year?: No    Cognitive Screening   1) Repeat 3 items (Banana, Sunrise, Chair)    2) Clock draw: Normal, knew he tracy numbers wrong.  Did correctly on second try  3) 3 item recall: Recalls 1 object   Results: Recalled 1/3.  This has been his base line.    Mini-CogTM Copyright TAMMY Lawton. Licensed by the author for use in [facility]; reprinted with permission ([email or web address]). All rights reserved.      Do you have sleep apnea, excessive snoring or daytime drowsiness?: no    Reviewed and updated as needed this visit by clinical " staff   Tobacco  Allergies  Meds  Problems               Reviewed and updated as needed this visit by Provider      Problems              Social History     Tobacco Use     Smoking status: Never Smoker     Smokeless tobacco: Never Used   Substance Use Topics     Alcohol use: Yes     Alcohol/week: 1.0 standard drink     Types: 1 Standard drinks or equivalent per week         Alcohol Use 6/14/2022   Prescreen: >3 drinks/day or >7 drinks/week? No           Urinary () - Male  Duration of complaint: 3 years.  ED.  Would like to try something other than Viagra.      Current providers sharing in care for this patient include:   Patient Care Team:  Karson Brown MD as PCP - General (Family Medicine)  George Infante,  as Assigned Surgical Provider    The following health maintenance items are reviewed in Epic and correct as of today:  Health Maintenance Due   Topic Date Due     ANNUAL REVIEW OF  ORDERS  Never done     COVID-19 Vaccine (1) Never done     COLORECTAL CANCER SCREENING  Never done     HEPATITIS C SCREENING  Never done     DTAP/TDAP/TD IMMUNIZATION (1 - Tdap) Never done     LIPID  Never done     ZOSTER IMMUNIZATION (1 of 2) Never done     Pneumococcal Vaccine: 65+ Years (1 - PCV) Never done     AORTIC ANEURYSM SCREENING (SYSTEM ASSIGNED)  Never done     Lab work is in process          Review of Systems   Constitutional: Negative for chills and fever.   HENT: Negative for congestion, ear pain, hearing loss and sore throat.    Eyes: Negative for pain and visual disturbance.   Respiratory: Positive for cough. Negative for shortness of breath.    Cardiovascular: Negative for chest pain, palpitations and peripheral edema.   Gastrointestinal: Positive for heartburn. Negative for abdominal pain, constipation, diarrhea, hematochezia and nausea.   Genitourinary: Positive for impotence. Negative for dysuria, frequency, genital sores, hematuria, penile discharge and urgency.   Musculoskeletal:  "Negative for arthralgias, joint swelling and myalgias.   Skin: Negative for rash.   Neurological: Negative for dizziness, weakness and paresthesias.   Psychiatric/Behavioral: Negative for mood changes. The patient is not nervous/anxious.      Coughs once a day for the last 6-8 months.    OBJECTIVE:   /88 (BP Location: Right arm, Patient Position: Sitting)   Pulse 73   Resp 16   Ht 1.759 m (5' 9.25\")   Wt 83 kg (183 lb)   SpO2 95%   BMI 26.83 kg/m   Estimated body mass index is 26.83 kg/m  as calculated from the following:    Height as of this encounter: 1.759 m (5' 9.25\").    Weight as of this encounter: 83 kg (183 lb).  Physical Exam  GENERAL: healthy, alert and no distress  EYES: Eyes grossly normal to inspection, PERRL and conjunctivae and sclerae normal  HENT: ear canals and TM's normal, nose and mouth without ulcers or lesions  NECK: no adenopathy, no asymmetry, masses, or scars and thyroid normal to palpation  RESP: lungs clear to auscultation - no rales, rhonchi or wheezes  CV: regular rate and rhythm, normal S1 S2, no S3 or S4, no murmur, click or rub, no peripheral edema and peripheral pulses strong  ABDOMEN: soft, nontender, no hepatosplenomegaly, no masses and bowel sounds normal  MS: no gross musculoskeletal defects noted, no edema  SKIN: no suspicious lesions or rashes  NEURO: Normal strength and tone, sensory exam grossly normal and oriented times 3, recall 1/3 MMSE 28/30  PSYCH: mentation appears normal, affect normal/bright        ASSESSMENT / PLAN:       ICD-10-CM    1. Encounter for lipid screening for cardiovascular disease  Z13.220 Lipid panel reflex to direct LDL Fasting    Z13.6    2. Screening for diabetes mellitus  Z13.1 Basic metabolic panel   3. Screening for prostate cancer  Z12.5 Prostate Specific Antigen Screen   4. Thyroid disorder screening  Z13.29 TSH   5. Special screening for malignant neoplasms, colon  Z12.11 COLOGUARD(EXACT SCIENCES)   6. Erectile dysfunction, " "unspecified erectile dysfunction type  N52.9 Adult Urology Referral   7. Encounter for Medicare annual wellness exam  Z00.00    8. Cough  R05.9 XR Chest 2 Views       Patient has been advised of split billing requirements and indicates understanding: Yes    COUNSELING:  Reviewed preventive health counseling, as reflected in patient instructions       Vision screening       Hearing screening       Colon cancer screening       Prostate cancer screening    Estimated body mass index is 26.83 kg/m  as calculated from the following:    Height as of this encounter: 1.759 m (5' 9.25\").    Weight as of this encounter: 83 kg (183 lb).    Weight management plan: Discussed healthy diet and exercise guidelines    He reports that he has never smoked. He has never used smokeless tobacco.      Appropriate preventive services were discussed with this patient, including applicable screening as appropriate for cardiovascular disease, diabetes, osteopenia/osteoporosis, and glaucoma.  As appropriate for age/gender, discussed screening for colorectal cancer, prostate cancer, breast cancer, and cervical cancer. Checklist reviewing preventive services available has been given to the patient.    Reviewed patients plan of care and provided an AVS. The Basic Care Plan (routine screening as documented in Health Maintenance) for Sarbjit meets the Care Plan requirement. This Care Plan has been established and reviewed with the Patient.    Counseling Resources:  ATP IV Guidelines  Pooled Cohorts Equation Calculator  Breast Cancer Risk Calculator  Breast Cancer: Medication to Reduce Risk  FRAX Risk Assessment  ICSI Preventive Guidelines  Dietary Guidelines for Americans, 2010  [employer]'s MyPlate  ASA Prophylaxis  Lung CA Screening    Karson Brown MD  [facility]    Identified Health Risks:  "

## 2022-07-26 LAB — NONINV COLON CA DNA+OCC BLD SCRN STL QL: NEGATIVE

## 2022-11-20 ENCOUNTER — HEALTH MAINTENANCE LETTER (OUTPATIENT)
Age: 72
End: 2022-11-20

## 2023-03-02 ENCOUNTER — OFFICE VISIT (OUTPATIENT)
Dept: FAMILY MEDICINE | Facility: CLINIC | Age: 73
End: 2023-03-02
Payer: COMMERCIAL

## 2023-03-02 VITALS
RESPIRATION RATE: 14 BRPM | SYSTOLIC BLOOD PRESSURE: 130 MMHG | DIASTOLIC BLOOD PRESSURE: 84 MMHG | OXYGEN SATURATION: 96 % | BODY MASS INDEX: 27.62 KG/M2 | TEMPERATURE: 98 F | HEART RATE: 76 BPM | WEIGHT: 188.4 LBS

## 2023-03-02 DIAGNOSIS — Z13.6 SCREENING FOR AAA (ABDOMINAL AORTIC ANEURYSM): ICD-10-CM

## 2023-03-02 DIAGNOSIS — Z13.29 THYROID DISORDER SCREENING: ICD-10-CM

## 2023-03-02 DIAGNOSIS — Z13.6 ENCOUNTER FOR LIPID SCREENING FOR CARDIOVASCULAR DISEASE: Primary | ICD-10-CM

## 2023-03-02 DIAGNOSIS — Z12.5 SCREENING FOR PROSTATE CANCER: ICD-10-CM

## 2023-03-02 DIAGNOSIS — Z13.1 SCREENING FOR DIABETES MELLITUS: ICD-10-CM

## 2023-03-02 DIAGNOSIS — Z82.49 FAMILY HISTORY OF ABDOMINAL AORTIC ANEURYSM: ICD-10-CM

## 2023-03-02 DIAGNOSIS — Z00.00 ENCOUNTER FOR MEDICARE ANNUAL WELLNESS EXAM: ICD-10-CM

## 2023-03-02 DIAGNOSIS — Z13.220 ENCOUNTER FOR LIPID SCREENING FOR CARDIOVASCULAR DISEASE: Primary | ICD-10-CM

## 2023-03-02 LAB
ANION GAP SERPL CALCULATED.3IONS-SCNC: 12 MMOL/L (ref 7–15)
BUN SERPL-MCNC: 11.8 MG/DL (ref 8–23)
CALCIUM SERPL-MCNC: 9.2 MG/DL (ref 8.8–10.2)
CHLORIDE SERPL-SCNC: 104 MMOL/L (ref 98–107)
CHOLEST SERPL-MCNC: 209 MG/DL
CREAT SERPL-MCNC: 1.18 MG/DL (ref 0.67–1.17)
DEPRECATED HCO3 PLAS-SCNC: 25 MMOL/L (ref 22–29)
ERYTHROCYTE [DISTWIDTH] IN BLOOD BY AUTOMATED COUNT: 12.4 % (ref 10–15)
GFR SERPL CREATININE-BSD FRML MDRD: 66 ML/MIN/1.73M2
GLUCOSE SERPL-MCNC: 97 MG/DL (ref 70–99)
HCT VFR BLD AUTO: 43.9 % (ref 40–53)
HDLC SERPL-MCNC: 45 MG/DL
HGB BLD-MCNC: 15.1 G/DL (ref 13.3–17.7)
LDLC SERPL CALC-MCNC: 144 MG/DL
MCH RBC QN AUTO: 32.2 PG (ref 26.5–33)
MCHC RBC AUTO-ENTMCNC: 34.4 G/DL (ref 31.5–36.5)
MCV RBC AUTO: 94 FL (ref 78–100)
NONHDLC SERPL-MCNC: 164 MG/DL
PLATELET # BLD AUTO: 209 10E3/UL (ref 150–450)
POTASSIUM SERPL-SCNC: 4.7 MMOL/L (ref 3.4–5.3)
PSA SERPL-MCNC: 2.58 NG/ML (ref 0–6.5)
RBC # BLD AUTO: 4.69 10E6/UL (ref 4.4–5.9)
SODIUM SERPL-SCNC: 141 MMOL/L (ref 136–145)
TRIGL SERPL-MCNC: 102 MG/DL
TSH SERPL DL<=0.005 MIU/L-ACNC: 0.67 UIU/ML (ref 0.3–4.2)
WBC # BLD AUTO: 6 10E3/UL (ref 4–11)

## 2023-03-02 PROCEDURE — 85027 COMPLETE CBC AUTOMATED: CPT | Performed by: FAMILY MEDICINE

## 2023-03-02 PROCEDURE — 80061 LIPID PANEL: CPT | Performed by: FAMILY MEDICINE

## 2023-03-02 PROCEDURE — 36415 COLL VENOUS BLD VENIPUNCTURE: CPT | Performed by: FAMILY MEDICINE

## 2023-03-02 PROCEDURE — 84443 ASSAY THYROID STIM HORMONE: CPT | Performed by: FAMILY MEDICINE

## 2023-03-02 PROCEDURE — G0439 PPPS, SUBSEQ VISIT: HCPCS | Performed by: FAMILY MEDICINE

## 2023-03-02 PROCEDURE — G0103 PSA SCREENING: HCPCS | Performed by: FAMILY MEDICINE

## 2023-03-02 PROCEDURE — 80048 BASIC METABOLIC PNL TOTAL CA: CPT | Performed by: FAMILY MEDICINE

## 2023-03-02 ASSESSMENT — ENCOUNTER SYMPTOMS
CONSTIPATION: 0
HEMATOCHEZIA: 0
WEAKNESS: 0
ABDOMINAL PAIN: 0
ARTHRALGIAS: 0
COUGH: 1
HEARTBURN: 0
NAUSEA: 0
JOINT SWELLING: 0
FEVER: 0
FREQUENCY: 0
PARESTHESIAS: 0
CHILLS: 0
HEADACHES: 0
NERVOUS/ANXIOUS: 0
HEMATOLOGIC/LYMPHATIC NEGATIVE: 1
SORE THROAT: 0
EYE PAIN: 0
SHORTNESS OF BREATH: 0
MYALGIAS: 0
PALPITATIONS: 0
DYSURIA: 0
ALLERGIC/IMMUNOLOGIC NEGATIVE: 1
DIARRHEA: 0
ENDOCRINE NEGATIVE: 1
DIZZINESS: 0
HEMATURIA: 0

## 2023-03-02 ASSESSMENT — ACTIVITIES OF DAILY LIVING (ADL): CURRENT_FUNCTION: NO ASSISTANCE NEEDED

## 2023-03-02 NOTE — PROGRESS NOTES
"SUBJECTIVE:   Sarbjit is a 72 year old who presents for Preventive Visit.  Patient has been advised of split billing requirements and indicates understanding: Yes  Are you in the first 12 months of your Medicare coverage?  No    Healthy Habits:     In general, how would you rate your overall health?  Good    Frequency of exercise:  None    Do you usually eat at least 4 servings of fruit and vegetables a day, include whole grains    & fiber and avoid regularly eating high fat or \"junk\" foods?  Yes    Taking medications regularly:  Yes    Barriers to taking medications:  None    Medication side effects:  Not applicable    Ability to successfully perform activities of daily living:  No assistance needed    Home Safety:  No safety concerns identified    Hearing Impairment:  No hearing concerns    In the past 6 months, have you been bothered by leaking of urine?  No    In general, how would you rate your overall mental or emotional health?  Good      PHQ-2 Total Score: 0    Additional concerns today:  No      Have you ever done Advance Care Planning? (For example, a Health Directive, POLST, or a discussion with a medical provider or your loved ones about your wishes): Yes, patient states has an Advance Care Planning document and will bring a copy to the clinic.       Fall risk  Fallen 2 or more times in the past year?: No  Any fall with injury in the past year?: No    Cognitive Screening   1) Repeat 3 items (Leader, Season, Table)    2) Clock draw: NORMAL  3) 3 item recall: Recalls 1 object   Results: NORMAL clock, 1-2 items recalled: COGNITIVE IMPAIRMENT LESS LIKELY    Mini-CogTM Copyright TAMMY Lawton. Licensed by the author for use in [facility]; reprinted with permission ([email or web address]). All rights reserved.      Do you have sleep apnea, excessive snoring or daytime drowsiness?: no    Reviewed and updated as needed this visit by clinical staff   Tobacco  Allergies  Meds              Reviewed and updated as " needed this visit by Provider                 Social History     Tobacco Use     Smoking status: Never     Smokeless tobacco: Never   Substance Use Topics     Alcohol use: Yes     Alcohol/week: 1.0 standard drink     Types: 1 Standard drinks or equivalent per week         Alcohol Use 3/2/2023   Prescreen: >3 drinks/day or >7 drinks/week? No       Current providers sharing in care for this patient include:   Patient Care Team:  Karson Brown MD as PCP - General (Family Medicine)  Karson Brown MD as Assigned PCP    The following health maintenance items are reviewed in Epic and correct as of today:  Health Maintenance   Topic Date Due     ANNUAL REVIEW OF HM ORDERS  Never done     COVID-19 Vaccine (1) Never done     HEPATITIS C SCREENING  Never done     DTAP/TDAP/TD IMMUNIZATION (1 - Tdap) Never done     ZOSTER IMMUNIZATION (1 of 2) Never done     Pneumococcal Vaccine: 65+ Years (1 - PCV) Never done     AORTIC ANEURYSM SCREENING (SYSTEM ASSIGNED)  Never done     INFLUENZA VACCINE (1) Never done     MEDICARE ANNUAL WELLNESS VISIT  06/14/2023     FALL RISK ASSESSMENT  03/02/2024     COLORECTAL CANCER SCREENING  07/18/2025     LIPID  06/14/2027     ADVANCE CARE PLANNING  06/14/2027     PHQ-2 (once per calendar year)  Completed     IPV IMMUNIZATION  Aged Out     MENINGITIS IMMUNIZATION  Aged Out               Review of Systems   Constitutional: Negative for chills and fever.   HENT: Positive for ear pain. Negative for congestion, hearing loss and sore throat.    Eyes: Negative for pain and visual disturbance.   Respiratory: Positive for cough. Negative for shortness of breath.    Cardiovascular: Negative for chest pain, palpitations and peripheral edema.   Gastrointestinal: Negative for abdominal pain, constipation, diarrhea, heartburn, hematochezia and nausea.   Endocrine: Negative.    Genitourinary: Negative for dysuria, frequency, genital sores, hematuria, impotence, penile discharge and urgency.  "  Musculoskeletal: Negative for arthralgias, joint swelling and myalgias.   Skin: Negative for rash.   Allergic/Immunologic: Negative.    Neurological: Negative for dizziness, weakness, headaches and paresthesias.   Hematological: Negative.    Psychiatric/Behavioral: Negative for mood changes. The patient is not nervous/anxious.          OBJECTIVE:   /84   Pulse 76   Temp 98  F (36.7  C)   Resp 14   Wt 85.5 kg (188 lb 6.4 oz)   SpO2 96%   BMI 27.62 kg/m   Estimated body mass index is 27.62 kg/m  as calculated from the following:    Height as of 6/14/22: 1.759 m (5' 9.25\").    Weight as of this encounter: 85.5 kg (188 lb 6.4 oz).  Physical Exam  GENERAL: healthy, alert and no distress  NECK: no adenopathy, no asymmetry, masses, or scars and thyroid normal to palpation  RESP: lungs clear to auscultation - no rales, rhonchi or wheezes  CV: regular rate and rhythm, normal S1 S2, no S3 or S4, no murmur, click or rub, no peripheral edema and peripheral pulses strong  ABDOMEN: soft, nontender, no hepatosplenomegaly, no masses and bowel sounds normal  MS: no gross musculoskeletal defects noted, no edema        ASSESSMENT / PLAN:   (Z13.220,  Z13.6) Encounter for lipid screening for cardiovascular disease  (primary encounter diagnosis)  Comment: stable    (Z13.6) Screening for AAA (abdominal aortic aneurysm)  Plan: Abdominal Aortic Aneurysm Screening/Tracking,         US Abdominal Aorta Imaging            (Z82.49) Family history of abdominal aortic aneurysm  Comment:   Plan: US Abdominal Aorta Imaging            (Z13.1) Screening for diabetes mellitus  Comment:   Plan: labs    (Z12.5) Screening for prostate cancer  Plan: PSA    (Z13.29) Thyroid disorder screening  Plan: TSH    (Z00.00) Encounter for Medicare annual wellness exam        COUNSELING:  Reviewed preventive health counseling, as reflected in patient instructions       Consider AAA screening for ages 65-75 and smoking history       Regular exercise      " " Healthy diet/nutrition       Vision screening       Hearing screening      BMI:   Estimated body mass index is 27.62 kg/m  as calculated from the following:    Height as of 6/14/22: 1.759 m (5' 9.25\").    Weight as of this encounter: 85.5 kg (188 lb 6.4 oz).         He reports that he has never smoked. He has never used smokeless tobacco.      Appropriate preventive services were discussed with this patient, including applicable screening as appropriate for cardiovascular disease, diabetes, osteopenia/osteoporosis, and glaucoma.  As appropriate for age/gender, discussed screening for colorectal cancer, prostate cancer, breast cancer, and cervical cancer. Checklist reviewing preventive services available has been given to the patient.    Reviewed patients plan of care and provided an AVS. The Basic Care Plan (routine screening as documented in Health Maintenance) for Sarbjit meets the Care Plan requirement. This Care Plan has been established and reviewed with the Patient.      Karson Brown MD  [facility]    Identified Health Risks:  "

## 2023-03-15 ENCOUNTER — TELEPHONE (OUTPATIENT)
Dept: FAMILY MEDICINE | Facility: CLINIC | Age: 73
End: 2023-03-15
Payer: COMMERCIAL

## 2023-03-15 NOTE — TELEPHONE ENCOUNTER
Order/Referral Request    Who is requesting: The patient is requesting an order be sent for his Aortic Ultrasound. [facility] states they have not received it yet.    Orders being requested: Aortic Ultrasound at [facility]    Reason service is needed/diagnosis: Family H/O of aortic issues     When are orders needed by: Whenever it can be sent.    Has this been discussed with Provider: Yes    Does patient have a preference on a Group/Provider/Facility? [facility]     Does patient have an appointment scheduled?: No    Where to send orders: Fax to [facility] [phone]    Could we send this information to you in [facility] or would you prefer to receive a phone call?:   Patient would prefer a phone call   Okay to leave a detailed message?: Yes at Cell number on file:    Telephone Information:   Mobile [phone]

## 2023-03-16 NOTE — TELEPHONE ENCOUNTER
Pt informed of AAA US order faxed to [facility] at [phone] and they will contact him.

## 2023-03-29 ENCOUNTER — TELEPHONE (OUTPATIENT)
Dept: FAMILY MEDICINE | Facility: CLINIC | Age: 73
End: 2023-03-29

## 2023-03-29 NOTE — TELEPHONE ENCOUNTER
Pt is requesting paper copies of all lab results from the last two years. He has access to them on [employer] but would like paper copies.

## 2023-03-30 ENCOUNTER — TRANSFERRED RECORDS (OUTPATIENT)
Dept: HEALTH INFORMATION MANAGEMENT | Facility: CLINIC | Age: 73
End: 2023-03-30
Payer: COMMERCIAL

## 2023-08-02 NOTE — TELEPHONE ENCOUNTER
Appointment Change  Cancel, Reschedule, Change to Virtual      Who are you speaking with? Patient   If it is not the patient, are they listed on an active communication consent form? N/A   Which provider is the appointment scheduled with? ANNETTE Benton   When is the appointment scheduled? Please list date and time              08/02/2023 @11:30Am    At which location is the appointment scheduled to take place? [facility]   Was the appointment rescheduled or changed from an in person visit to a virtual visit? If so, please list the details of the change. Yes, 08/03/2023 @11:30AM    What is the reason for the appointment change? Patient over slept, I made the patient aware of the 2 no shows that she has and also informed her of the no show policy 2 different times. Was [facility] transport scheduled for this visit? No   Does [facility] transport need to be scheduled for the new visit (if applicable) No   Does the patient need an infusion appointment rescheduled? No   Does the patient have an infusion appointment scheduled? If so, when? No   Is the patient undergoing chemotherapy? No   Was the no-show policy reviewed for appointments being changed with less then 24 hours of notice?  Yes Order sent to [facility] through EMR, they will contact patient to schedule. Patient is aware.

## 2023-10-05 ENCOUNTER — TRANSFERRED RECORDS (OUTPATIENT)
Dept: HEALTH INFORMATION MANAGEMENT | Facility: CLINIC | Age: 73
End: 2023-10-05
Payer: COMMERCIAL

## 2024-02-12 ENCOUNTER — TRANSFERRED RECORDS (OUTPATIENT)
Dept: HEALTH INFORMATION MANAGEMENT | Facility: CLINIC | Age: 74
End: 2024-02-12
Payer: COMMERCIAL

## 2024-03-05 ENCOUNTER — OFFICE VISIT (OUTPATIENT)
Dept: FAMILY MEDICINE | Facility: CLINIC | Age: 74
End: 2024-03-05
Payer: COMMERCIAL

## 2024-03-05 VITALS
OXYGEN SATURATION: 97 % | WEIGHT: 189 LBS | BODY MASS INDEX: 27.99 KG/M2 | RESPIRATION RATE: 16 BRPM | HEART RATE: 80 BPM | DIASTOLIC BLOOD PRESSURE: 80 MMHG | TEMPERATURE: 97.1 F | HEIGHT: 69 IN | SYSTOLIC BLOOD PRESSURE: 108 MMHG

## 2024-03-05 DIAGNOSIS — Z13.1 SCREENING FOR DIABETES MELLITUS: ICD-10-CM

## 2024-03-05 DIAGNOSIS — Z11.59 NEED FOR HEPATITIS C SCREENING TEST: Primary | ICD-10-CM

## 2024-03-05 DIAGNOSIS — Z13.6 ENCOUNTER FOR LIPID SCREENING FOR CARDIOVASCULAR DISEASE: ICD-10-CM

## 2024-03-05 DIAGNOSIS — Z29.11 NEED FOR VACCINATION AGAINST RESPIRATORY SYNCYTIAL VIRUS: ICD-10-CM

## 2024-03-05 DIAGNOSIS — Z13.29 THYROID DISORDER SCREENING: ICD-10-CM

## 2024-03-05 DIAGNOSIS — Z13.220 ENCOUNTER FOR LIPID SCREENING FOR CARDIOVASCULAR DISEASE: ICD-10-CM

## 2024-03-05 DIAGNOSIS — Z12.5 SCREENING FOR PROSTATE CANCER: ICD-10-CM

## 2024-03-05 DIAGNOSIS — Z23 NEED FOR SHINGLES VACCINE: ICD-10-CM

## 2024-03-05 DIAGNOSIS — Z23 NEED FOR TDAP VACCINATION: ICD-10-CM

## 2024-03-05 LAB
ERYTHROCYTE [DISTWIDTH] IN BLOOD BY AUTOMATED COUNT: 12.5 % (ref 10–15)
HCT VFR BLD AUTO: 44.2 % (ref 40–53)
HGB BLD-MCNC: 15.2 G/DL (ref 13.3–17.7)
MCH RBC QN AUTO: 32.1 PG (ref 26.5–33)
MCHC RBC AUTO-ENTMCNC: 34.4 G/DL (ref 31.5–36.5)
MCV RBC AUTO: 93 FL (ref 78–100)
PLATELET # BLD AUTO: 216 10E3/UL (ref 150–450)
RBC # BLD AUTO: 4.73 10E6/UL (ref 4.4–5.9)
WBC # BLD AUTO: 4.8 10E3/UL (ref 4–11)

## 2024-03-05 PROCEDURE — 86803 HEPATITIS C AB TEST: CPT | Performed by: FAMILY MEDICINE

## 2024-03-05 PROCEDURE — G0103 PSA SCREENING: HCPCS | Performed by: FAMILY MEDICINE

## 2024-03-05 PROCEDURE — 80048 BASIC METABOLIC PNL TOTAL CA: CPT | Performed by: FAMILY MEDICINE

## 2024-03-05 PROCEDURE — 85027 COMPLETE CBC AUTOMATED: CPT | Performed by: FAMILY MEDICINE

## 2024-03-05 PROCEDURE — 80061 LIPID PANEL: CPT | Performed by: FAMILY MEDICINE

## 2024-03-05 PROCEDURE — 36415 COLL VENOUS BLD VENIPUNCTURE: CPT | Performed by: FAMILY MEDICINE

## 2024-03-05 PROCEDURE — 84443 ASSAY THYROID STIM HORMONE: CPT | Performed by: FAMILY MEDICINE

## 2024-03-05 PROCEDURE — G0439 PPPS, SUBSEQ VISIT: HCPCS | Performed by: FAMILY MEDICINE

## 2024-03-05 RX ORDER — RESPIRATORY SYNCYTIAL VIRUS VACCINE 120MCG/0.5
0.5 KIT INTRAMUSCULAR ONCE
Qty: 1 EACH | Refills: 0 | Status: CANCELLED | OUTPATIENT
Start: 2024-03-05 | End: 2024-03-05

## 2024-03-05 SDOH — HEALTH STABILITY: PHYSICAL HEALTH: ON AVERAGE, HOW MANY DAYS PER WEEK DO YOU ENGAGE IN MODERATE TO STRENUOUS EXERCISE (LIKE A BRISK WALK)?: 5 DAYS

## 2024-03-05 ASSESSMENT — SOCIAL DETERMINANTS OF HEALTH (SDOH): HOW OFTEN DO YOU GET TOGETHER WITH FRIENDS OR RELATIVES?: MORE THAN THREE TIMES A WEEK

## 2024-03-05 NOTE — PROGRESS NOTES
"Preventive Care Visit  [facility]  Karson Brown MD, Family Medicine  Mar 5, 2024    Assessment & Plan     Need for shingles vaccine    Need for Tdap vaccination    Need for vaccination against respiratory syncytial virus    Need for hepatitis C screening test  - Hepatitis C Screen Reflex to HCV RNA Quant and Genotype; Future          BMI  Estimated body mass index is 27.71 kg/m  as calculated from the following:    Height as of this encounter: 1.759 m (5' 9.25\").    Weight as of this encounter: 85.7 kg (189 lb).       Counseling  Appropriate preventive services were discussed with this patient, including applicable screening as appropriate for fall prevention, nutrition, physical activity, Tobacco-use cessation, weight loss and cognition.  Checklist reviewing preventive services available has been given to the patient.  Reviewed patient's diet, addressing concerns and/or questions.   The patient was instructed to see the dentist every 6 months.   He is at risk for psychosocial distress and has been provided with information to reduce risk.   The patient was provided with written information regarding signs of hearing loss.         Jakob Schaffer is a 73 year old, presenting for the following:  Physical (AWV)        3/5/2024     8:05 AM   Additional Questions   Roomed by srafia   Accompanied by n/a         Health Care Directive  Patient does not have a Health Care Directive or Living Will: Patient states has Advance Directive and will bring in a copy to clinic.    HPI  Well Adult Visit    No Chronic Problems        3/5/2024   General Health   How would you rate your overall physical health? Excellent   Feel stress (tense, anxious, or unable to sleep) Only a little   (!) STRESS CONCERN      3/5/2024   Nutrition   Diet: Regular (no restrictions)         3/5/2024   Exercise   Days per week of moderate/strenous exercise 5 days         3/5/2024   Social Factors   Frequency of " gathering with friends or relatives More than three times a week   Worry food won't last until get money to buy more No   Food not last or not have enough money for food? Patient declined   Do you have housing?  Yes   Are you worried about losing your housing? No   Lack of transportation? No   Unable to get utilities (heat,electricity)? No         3/5/2024   Fall Risk   Fallen 2 or more times in the past year? No   Trouble with walking or balance? No          3/5/2024   Activities of Daily Living- Home Safety   Needs help with the following daily activites None of the above   Safety concerns in the home None of the above         3/5/2024   Dental   Dentist two times every year? (!) NO         3/5/2024   Hearing Screening   Hearing concerns? (!) IT'S HARD TO FOLLOW A CONVERSATION IN A NOISY [employer]AURANT OR CROWDED ROOM.    (!) TROUBLE UNDERSTANDING SOFT OR WHISPERED SPEECH.         3/5/2024   Driving Risk Screening   Patient/family members have concerns about driving No         3/5/2024   General Alertness/Fatigue Screening   Have you been more tired than usual lately? No         3/5/2024   Urinary Incontinence Screening   Bothered by leaking urine in past 6 months No            Today's PHQ-2 Score:       3/5/2024     8:04 AM   PHQ-2 ( 1999 [employer])   Q1: Little interest or pleasure in doing things 0   Q2: Feeling down, depressed or hopeless 0   PHQ-2 Score 0   Q1: Little interest or pleasure in doing things Not at all   Q2: Feeling down, depressed or hopeless Not at all   PHQ-2 Score 0           3/5/2024   Substance Use   Alcohol more than 3/day or more than 7/wk No   Do you have a current opioid prescription? No   How severe/bad is pain from 1 to 10? 2/10   Do you use any other substances recreationally? No     Social History     Tobacco Use    Smoking status: Never     Passive exposure: Never    Smokeless tobacco: Never   Vaping Use    Vaping Use: Never used   Substance Use Topics    Alcohol use: Yes      Alcohol/week: 1.0 standard drink of alcohol     Types: 1 Standard drinks or equivalent per week    Drug use: Never           3/5/2024   AAA Screening   Family history of Abdominal Aortic Aneurysm (AAA)? (!) YES Had screen negative   ASCVD Risk  Discussed  The 10-year ASCVD risk score (Keila ACEVEDO, et al., 2019) is: 16.7%    Values used to calculate the score:      Age: 73 years      Sex: Male      Is Non- : No      Diabetic: No      Tobacco smoker: No      Systolic Blood Pressure: 108 mmHg      Is BP treated: No      HDL Cholesterol: 45 mg/dL      Total Cholesterol: 209 mg/dL            Reviewed and updated as needed this visit by Provider                      Current providers sharing in care for this patient include:  Patient Care Team:  Karson Brown MD as PCP - General (Family Medicine)  Karson Brown MD as Assigned PCP    The following health maintenance items are reviewed in Epic and correct as of today:  Health Maintenance   Topic Date Due    HEPATITIS C SCREENING  Never done    DTAP/TDAP/TD IMMUNIZATION (1 - Tdap) Never done    ZOSTER IMMUNIZATION (1 of 2) Never done    RSV VACCINE (Pregnancy & 60+) (1 - 1-dose 60+ series) Never done    Pneumococcal Vaccine: 65+ Years (1 of 1 - PCV) Never done    INFLUENZA VACCINE (1) Never done    COVID-19 Vaccine (1 - 2023-24 season) Never done    ANNUAL REVIEW OF HM ORDERS  03/02/2024    MEDICARE ANNUAL WELLNESS VISIT  03/02/2024    FALL RISK ASSESSMENT  03/05/2025    COLORECTAL CANCER SCREENING  07/18/2025    GLUCOSE  03/02/2026    LIPID  03/02/2028    ADVANCE CARE PLANNING  03/02/2028    PHQ-2 (once per calendar year)  Completed    IPV IMMUNIZATION  Aged Out    HPV IMMUNIZATION  Aged Out    MENINGITIS IMMUNIZATION  Aged Out    RSV MONOCLONAL ANTIBODY  Aged Out            Objective    Exam  /80 (BP Location: Right arm, Patient Position: Sitting)   Pulse 80   Temp 97.1  F (36.2  C) (Tympanic)   Resp 16   Ht 1.759  "m (5' 9.25\")   Wt 85.7 kg (189 lb)   SpO2 97%   BMI 27.71 kg/m     Estimated body mass index is 27.71 kg/m  as calculated from the following:    Height as of this encounter: 1.759 m (5' 9.25\").    Weight as of this encounter: 85.7 kg (189 lb).    Physical Exam  GENERAL: alert and no distress  NECK: no adenopathy, no asymmetry, masses, or scars  RESP: lungs clear to auscultation - no rales, rhonchi or wheezes  CV: regular rate and rhythm, normal S1 S2, no S3 or S4, no murmur, click or rub, no peripheral edema  ABDOMEN: soft, nontender, no hepatosplenomegaly, no masses and bowel sounds normal  MS: no gross musculoskeletal defects noted, no edema        3/5/2024   Mini Cog   Clock Draw Score 2 Normal   3 Item Recall 1 object recalled   Mini Cog Total Score 3            Signed Electronically by: Karson Brown MD    "

## 2024-03-06 LAB
ANION GAP SERPL CALCULATED.3IONS-SCNC: 9 MMOL/L (ref 7–15)
BUN SERPL-MCNC: 16.7 MG/DL (ref 8–23)
CALCIUM SERPL-MCNC: 9.4 MG/DL (ref 8.8–10.2)
CHLORIDE SERPL-SCNC: 106 MMOL/L (ref 98–107)
CHOLEST SERPL-MCNC: 181 MG/DL
CREAT SERPL-MCNC: 1.23 MG/DL (ref 0.67–1.17)
DEPRECATED HCO3 PLAS-SCNC: 26 MMOL/L (ref 22–29)
EGFRCR SERPLBLD CKD-EPI 2021: 62 ML/MIN/1.73M2
FASTING STATUS PATIENT QL REPORTED: ABNORMAL
GLUCOSE SERPL-MCNC: 96 MG/DL (ref 70–99)
HCV AB SERPL QL IA: NONREACTIVE
HDLC SERPL-MCNC: 46 MG/DL
LDLC SERPL CALC-MCNC: 124 MG/DL
NONHDLC SERPL-MCNC: 135 MG/DL
POTASSIUM SERPL-SCNC: 4.4 MMOL/L (ref 3.4–5.3)
PSA SERPL DL<=0.01 NG/ML-MCNC: 3.32 NG/ML (ref 0–6.5)
SODIUM SERPL-SCNC: 141 MMOL/L (ref 135–145)
TRIGL SERPL-MCNC: 54 MG/DL
TSH SERPL DL<=0.005 MIU/L-ACNC: 0.78 UIU/ML (ref 0.3–4.2)

## 2024-04-23 ENCOUNTER — TRANSFERRED RECORDS (OUTPATIENT)
Dept: HEALTH INFORMATION MANAGEMENT | Facility: CLINIC | Age: 74
End: 2024-04-23
Payer: COMMERCIAL

## 2025-03-03 SDOH — HEALTH STABILITY: PHYSICAL HEALTH: ON AVERAGE, HOW MANY DAYS PER WEEK DO YOU ENGAGE IN MODERATE TO STRENUOUS EXERCISE (LIKE A BRISK WALK)?: 4 DAYS

## 2025-03-03 SDOH — HEALTH STABILITY: PHYSICAL HEALTH: ON AVERAGE, HOW MANY MINUTES DO YOU ENGAGE IN EXERCISE AT THIS LEVEL?: 20 MIN

## 2025-03-03 ASSESSMENT — SOCIAL DETERMINANTS OF HEALTH (SDOH): HOW OFTEN DO YOU GET TOGETHER WITH FRIENDS OR RELATIVES?: ONCE A WEEK

## 2025-03-06 ENCOUNTER — OFFICE VISIT (OUTPATIENT)
Dept: FAMILY MEDICINE | Facility: CLINIC | Age: 75
End: 2025-03-06
Payer: COMMERCIAL

## 2025-03-06 VITALS
TEMPERATURE: 97.7 F | RESPIRATION RATE: 16 BRPM | WEIGHT: 185.8 LBS | HEART RATE: 67 BPM | OXYGEN SATURATION: 97 % | DIASTOLIC BLOOD PRESSURE: 70 MMHG | HEIGHT: 69 IN | BODY MASS INDEX: 27.52 KG/M2 | SYSTOLIC BLOOD PRESSURE: 110 MMHG

## 2025-03-06 DIAGNOSIS — Z13.29 THYROID DISORDER SCREENING: ICD-10-CM

## 2025-03-06 DIAGNOSIS — Z13.1 SCREENING FOR DIABETES MELLITUS: ICD-10-CM

## 2025-03-06 DIAGNOSIS — Z13.6 ENCOUNTER FOR LIPID SCREENING FOR CARDIOVASCULAR DISEASE: ICD-10-CM

## 2025-03-06 DIAGNOSIS — Z13.220 ENCOUNTER FOR LIPID SCREENING FOR CARDIOVASCULAR DISEASE: ICD-10-CM

## 2025-03-06 DIAGNOSIS — Z12.5 SCREENING FOR PROSTATE CANCER: ICD-10-CM

## 2025-03-06 DIAGNOSIS — Z00.00 MEDICARE ANNUAL WELLNESS VISIT, SUBSEQUENT: Primary | ICD-10-CM

## 2025-03-06 LAB
ANION GAP SERPL CALCULATED.3IONS-SCNC: 10 MMOL/L (ref 7–15)
BUN SERPL-MCNC: 13.4 MG/DL (ref 8–23)
CALCIUM SERPL-MCNC: 9.9 MG/DL (ref 8.8–10.4)
CHLORIDE SERPL-SCNC: 107 MMOL/L (ref 98–107)
CHOLEST SERPL-MCNC: 192 MG/DL
CREAT SERPL-MCNC: 1.31 MG/DL (ref 0.67–1.17)
EGFRCR SERPLBLD CKD-EPI 2021: 57 ML/MIN/1.73M2
ERYTHROCYTE [DISTWIDTH] IN BLOOD BY AUTOMATED COUNT: 12.2 % (ref 10–15)
FASTING STATUS PATIENT QL REPORTED: ABNORMAL
FASTING STATUS PATIENT QL REPORTED: ABNORMAL
GLUCOSE SERPL-MCNC: 103 MG/DL (ref 70–99)
HCO3 SERPL-SCNC: 24 MMOL/L (ref 22–29)
HCT VFR BLD AUTO: 45.6 % (ref 40–53)
HDLC SERPL-MCNC: 38 MG/DL
HGB BLD-MCNC: 16.1 G/DL (ref 13.3–17.7)
LDLC SERPL CALC-MCNC: 139 MG/DL
MCH RBC QN AUTO: 32.5 PG (ref 26.5–33)
MCHC RBC AUTO-ENTMCNC: 35.3 G/DL (ref 31.5–36.5)
MCV RBC AUTO: 92 FL (ref 78–100)
NONHDLC SERPL-MCNC: 154 MG/DL
PLATELET # BLD AUTO: 194 10E3/UL (ref 150–450)
POTASSIUM SERPL-SCNC: 4.8 MMOL/L (ref 3.4–5.3)
PSA SERPL DL<=0.01 NG/ML-MCNC: 3.95 NG/ML (ref 0–6.5)
RBC # BLD AUTO: 4.95 10E6/UL (ref 4.4–5.9)
SODIUM SERPL-SCNC: 141 MMOL/L (ref 135–145)
TRIGL SERPL-MCNC: 75 MG/DL
TSH SERPL DL<=0.005 MIU/L-ACNC: 0.5 UIU/ML (ref 0.3–4.2)
WBC # BLD AUTO: 4.9 10E3/UL (ref 4–11)

## 2025-03-06 NOTE — LETTER
March 6, 2025      Sarbjit Butcher   [address] [facility] [id number]        Dear ,    We are writing to inform you of your test results.    Test results indicate you may require additional follow up, see comment below.  Please follow a low cholesterol diet and avoid ibuprofen or Aleve.     Latest Reference Range & Units 03/06/25 09:30   Sodium 135 - 145 mmol/L 141   Potassium 3.4 - 5.3 mmol/L 4.8   Chloride 98 - 107 mmol/L 107   Carbon Dioxide (CO2) 22 - 29 mmol/L 24   Urea Nitrogen 8.0 - 23.0 mg/dL 13.4   Creatinine 0.67 - 1.17 mg/dL 1.31 (H)   GFR Estimate >60 mL/min/1.73m2 57 (L)   Calcium 8.8 - 10.4 mg/dL 9.9   Anion Gap 7 - 15 mmol/L 10   Cholesterol <200 mg/dL 192   Patient Fasting?  Unknown  Unknown   Glucose 70 - 99 mg/dL 103 (H)   HDL Cholesterol >=40 mg/dL 38 (L)   LDL Cholesterol Calculated <100 mg/dL 139 (H)   Non HDL Cholesterol <130 mg/dL 154 (H)   Prostate Specific Antigen Screen 0.00 - 6.50 ng/mL 3.95   Triglycerides <150 mg/dL 75   TSH 0.30 - 4.20 uIU/mL 0.50   WBC 4.0 - 11.0 10e3/uL 4.9   Hemoglobin 13.3 - 17.7 g/dL 16.1   Hematocrit 40.0 - 53.0 % 45.6   Platelet Count 150 - 450 10e3/uL 194   RBC Count 4.40 - 5.90 10e6/uL 4.95   MCV 78 - 100 fL 92   MCH 26.5 - 33.0 pg 32.5   MCHC 31.5 - 36.5 g/dL 35.3   RDW 10.0 - 15.0 % 12.2   (H): Data is abnormally high  (L): Data is abnormally low    If you have any questions or concerns, please call the clinic at the number listed above.       Sincerely,          Electronically signed

## 2025-03-06 NOTE — PROGRESS NOTES
"Preventive Care Visit  [facility]  Karson Brown MD, Family Medicine  Mar 6, 2025      Assessment & Plan     Medicare annual wellness visit, subsequent    Encounter for lipid screening for cardiovascular disease  - Lipid panel reflex to direct LDL Fasting; Future    Screening for diabetes mellitus  - CBC with platelets; Future  - Basic metabolic panel; Future    Screening for prostate cancer  - Prostate Specific Antigen Screen; Future    Thyroid disorder screening  - TSH; Future        BMI  Estimated body mass index is 27.24 kg/m  as calculated from the following:    Height as of this encounter: 1.759 m (5' 9.25\").    Weight as of this encounter: 84.3 kg (185 lb 12.8 oz).       Counseling  Appropriate preventive services were addressed with this patient via screening, questionnaire, or discussion as appropriate for fall prevention, nutrition, physical activity, Tobacco-use cessation, social engagement, weight loss and cognition.  Checklist reviewing preventive services available has been given to the patient.  Reviewed patient's diet, addressing concerns and/or questions.   Discussed possible causes of fatigue. The patient was provided with written information regarding signs of hearing loss.           Jakob Schaffer is a 74 year old, presenting for the following:  Medicare Visit (AWV)        3/6/2025     8:46 AM   Additional Questions   Roomed by Nia           [facility]       Well adult exam.    Advance Care Planning  Patient does not have a Health Care Directive: Discussed advance care planning with patient; however, patient declined at this time.      3/3/2025   General Health   How would you rate your overall physical health? Good   Feel stress (tense, anxious, or unable to sleep) Not at all         3/3/2025   Nutrition   Diet: Regular (no restrictions)         3/3/2025   Exercise   Days per week of moderate/strenous exercise 4 days   Average minutes spent exercising at " this level 20 min         3/3/2025   Social Factors   Frequency of gathering with friends or relatives Once a week   Worry food won't last until get money to buy more No   Food not last or not have enough money for food? No   Do you have housing? (Housing is defined as stable permanent housing and does not include staying ouside in a car, in a tent, in an abandoned building, in an overnight shelter, or couch-surfing.) Yes   Are you worried about losing your housing? No   Lack of transportation? No   Unable to get utilities (heat,electricity)? No         3/3/2025   Fall Risk   Fallen 2 or more times in the past year? No   Trouble with walking or balance? No          3/3/2025   Activities of Daily Living- Home Safety   Needs help with the following daily activites None of the above   Safety concerns in the home None of the above         3/3/2025   Dental   Dentist two times every year? Yes         3/3/2025   Hearing Screening   Hearing concerns? (!) I NEED TO ASK PEOPLE TO SPEAK UP OR REPEAT THEMSELVES.    (!) IT'S HARD TO FOLLOW A CONVERSATION IN A NOISY [employer]AUR[employer] OR CROWDED ROOM.       Multiple values from one day are sorted in reverse-chronological order         3/3/2025   Driving Risk Screening   Patient/family members have concerns about driving No         3/3/2025   General Alertness/Fatigue Screening   Have you been more tired than usual lately? (!) YES         3/3/2025   Urinary Incontinence Screening   Bothered by leaking urine in past 6 months No            Today's PHQ-2 Score:       3/6/2025     8:39 AM   PHQ-2 ( 1999 Pf[employer])   Q1: Little interest or pleasure in doing things 0   Q2: Feeling down, depressed or hopeless 0   PHQ-2 Score 0    Q1: Little interest or pleasure in doing things Not at all   Q2: Feeling down, depressed or hopeless Not at all   PHQ-2 Score 0       Patient-reported           3/3/2025   Substance Use   Alcohol more than 3/day or more than 7/wk No   Do you have a current opioid  prescription? No   How severe/bad is pain from 1 to 10? 0/10 (No Pain)   Do you use any other substances recreationally? No     Social History     Tobacco Use    Smoking status: Never     Passive exposure: Never    Smokeless tobacco: Never   Vaping Use    Vaping status: Never Used   Substance Use Topics    Alcohol use: Not Currently     Alcohol/week: 1.0 standard drink of alcohol     Types: 1 Standard drinks or equivalent per week    Drug use: Never           3/3/2025   AAA Screening   Family history of Abdominal Aortic Aneurysm (AAA)? (!) YES    ASCVD Risk   The 10-year ASCVD risk score (Keila ACEVEDO, et al., 2019) is: 17.1%    Values used to calculate the score:      Age: 74 years      Sex: Male      Is Non- : No      Diabetic: No      Tobacco smoker: No      Systolic Blood Pressure: 110 mmHg      Is BP treated: No      HDL Cholesterol: 46 mg/dL      Total Cholesterol: 181 mg/dL          Reviewed and updated as needed this visit by Provider     Meds                  Current providers sharing in care for this patient include:  Patient Care Team:  Karson Brown MD as PCP - General (Family Medicine)  Karson Brown MD as Assigned PCP    The following health maintenance items are reviewed in Epic and correct as of today:  Health Maintenance   Topic Date Due    DTAP/TDAP/TD IMMUNIZATION (1 - Tdap) Never done    Pneumococcal Vaccine: 50+ Years (1 of 1 - PCV) Never done    ZOSTER IMMUNIZATION (1 of 2) Never done    INFLUENZA VACCINE (1) Never done    COVID-19 Vaccine (1 - 2024-25 season) Never done    MEDICARE ANNUAL WELLNESS VISIT  03/05/2025    COLORECTAL CANCER SCREENING  07/18/2025    ANNUAL REVIEW OF HM ORDERS  03/06/2026    FALL RISK ASSESSMENT  03/06/2026    RSV VACCINE (1 - 1-dose 75+ series) 07/24/2026    GLUCOSE  03/05/2027    LIPID  03/05/2029    ADVANCE CARE PLANNING  03/06/2030    HEPATITIS C SCREENING  Completed    PHQ-2 (once per calendar year)  Completed     "HPV IMMUNIZATION  Aged Out    MENINGITIS IMMUNIZATION  Aged Out            Objective    Exam  /70   Pulse 67   Temp 97.7  F (36.5  C) (Tympanic)   Resp 16   Ht 1.759 m (5' 9.25\")   Wt 84.3 kg (185 lb 12.8 oz)   SpO2 97%   BMI 27.24 kg/m     Estimated body mass index is 27.24 kg/m  as calculated from the following:    Height as of this encounter: 1.759 m (5' 9.25\").    Weight as of this encounter: 84.3 kg (185 lb 12.8 oz).    Physical Exam  GENERAL: alert and no distress  NECK: no adenopathy, no asymmetry, masses, or scars  RESP: lungs clear to auscultation - no rales, rhonchi or wheezes  CV: regular rate and rhythm, normal S1 S2, no S3 or S4, no murmur, click or rub, no peripheral edema  ABDOMEN: soft, nontender, no hepatosplenomegaly, no masses and bowel sounds normal  MS: no gross musculoskeletal defects noted, no edema        3/6/2025   Mini Cog   Clock Draw Score 2 Normal   3 Item Recall 3 objects recalled   Mini Cog Total Score 5              Signed Electronically by: Karson Brown MD    "

## 2025-05-22 ENCOUNTER — ORDERS ONLY (AUTO-RELEASED) (OUTPATIENT)
Dept: FAMILY MEDICINE | Facility: CLINIC | Age: 75
End: 2025-05-22
Payer: COMMERCIAL

## 2025-05-22 DIAGNOSIS — Z12.11 SPECIAL SCREENING FOR MALIGNANT NEOPLASMS, COLON: Primary | ICD-10-CM

## 2025-05-22 DIAGNOSIS — Z12.11 SPECIAL SCREENING FOR MALIGNANT NEOPLASMS, COLON: ICD-10-CM

## 2025-08-11 DIAGNOSIS — Z12.11 COLON CANCER SCREENING: ICD-10-CM

## 2025-08-14 LAB — NONINV COLON CA DNA+OCC BLD SCRN STL QL: NEGATIVE
